# Patient Record
Sex: MALE | Race: WHITE | Employment: FULL TIME | URBAN - METROPOLITAN AREA
[De-identification: names, ages, dates, MRNs, and addresses within clinical notes are randomized per-mention and may not be internally consistent; named-entity substitution may affect disease eponyms.]

---

## 2022-12-20 ENCOUNTER — HOSPITAL ENCOUNTER (EMERGENCY)
Age: 52
Discharge: HOME | End: 2022-12-20
Attending: EMERGENCY MEDICINE | Admitting: EMERGENCY MEDICINE
Payer: MEDICAID

## 2022-12-20 ENCOUNTER — HOSPITAL ENCOUNTER (EMERGENCY)
Dept: GENERAL RADIOLOGY | Age: 52
Discharge: HOME | End: 2022-12-23
Payer: MEDICAID

## 2022-12-20 ENCOUNTER — HOSPITAL ENCOUNTER (INPATIENT)
Age: 52
LOS: 1 days | Discharge: HOME | DRG: 174 | End: 2022-12-22
Attending: INTERNAL MEDICINE | Admitting: INTERNAL MEDICINE
Payer: MEDICAID

## 2022-12-20 ENCOUNTER — HOSPITAL ENCOUNTER (EMERGENCY)
Dept: CT IMAGING | Age: 52
Discharge: HOME | End: 2022-12-23
Payer: MEDICAID

## 2022-12-20 VITALS
HEIGHT: 72 IN | WEIGHT: 250 LBS | BODY MASS INDEX: 33.86 KG/M2 | OXYGEN SATURATION: 93 % | SYSTOLIC BLOOD PRESSURE: 126 MMHG | TEMPERATURE: 98.2 F | DIASTOLIC BLOOD PRESSURE: 65 MMHG | HEART RATE: 100 BPM | RESPIRATION RATE: 18 BRPM

## 2022-12-20 DIAGNOSIS — I25.10 CAD (CORONARY ARTERY DISEASE): ICD-10-CM

## 2022-12-20 DIAGNOSIS — R07.9 CHEST PAIN, UNSPECIFIED TYPE: ICD-10-CM

## 2022-12-20 DIAGNOSIS — I21.4 NSTEMI (NON-ST ELEVATED MYOCARDIAL INFARCTION) (HCC): Primary | ICD-10-CM

## 2022-12-20 DIAGNOSIS — R07.9 CHEST PAIN: ICD-10-CM

## 2022-12-20 DIAGNOSIS — Z09 HOSPITAL DISCHARGE FOLLOW-UP: Primary | ICD-10-CM

## 2022-12-20 DIAGNOSIS — I21.4 NSTEMI (NON-ST ELEVATED MYOCARDIAL INFARCTION) (HCC): ICD-10-CM

## 2022-12-20 PROBLEM — R73.09 ELEVATED GLUCOSE: Chronic | Status: ACTIVE | Noted: 2022-12-20

## 2022-12-20 PROBLEM — I10 HYPERTENSION: Chronic | Status: ACTIVE | Noted: 2022-12-20

## 2022-12-20 PROBLEM — Z72.0 TOBACCO ABUSE: Chronic | Status: ACTIVE | Noted: 2022-12-20

## 2022-12-20 PROBLEM — I95.9 HYPOTENSION: Status: ACTIVE | Noted: 2022-12-20

## 2022-12-20 LAB
ALBUMIN SERPL-MCNC: 3.9 G/DL (ref 3.5–5)
ALBUMIN/GLOB SERPL: 1.2 {RATIO} (ref 0.4–1.6)
ALP SERPL-CCNC: 51 U/L (ref 50–136)
ALT SERPL-CCNC: 42 U/L (ref 12–65)
ANION GAP SERPL CALC-SCNC: 7 MMOL/L (ref 2–11)
APTT PPP: 40.3 SEC (ref 24.5–34.2)
AST SERPL-CCNC: 19 U/L (ref 15–37)
BILIRUB SERPL-MCNC: 0.4 MG/DL (ref 0.2–1.1)
BUN SERPL-MCNC: 19 MG/DL (ref 6–23)
CALCIUM SERPL-MCNC: 9.9 MG/DL (ref 8.3–10.4)
CHLORIDE SERPL-SCNC: 105 MMOL/L (ref 101–110)
CO2 SERPL-SCNC: 28 MMOL/L (ref 21–32)
CREAT SERPL-MCNC: 1.14 MG/DL (ref 0.8–1.5)
EKG ATRIAL RATE: 118 BPM
EKG ATRIAL RATE: 99 BPM
EKG DIAGNOSIS: NORMAL
EKG DIAGNOSIS: NORMAL
EKG P AXIS: 56 DEGREES
EKG P AXIS: 58 DEGREES
EKG P-R INTERVAL: 144 MS
EKG P-R INTERVAL: 150 MS
EKG Q-T INTERVAL: 310 MS
EKG Q-T INTERVAL: 316 MS
EKG QRS DURATION: 72 MS
EKG QRS DURATION: 74 MS
EKG QTC CALCULATION (BAZETT): 405 MS
EKG QTC CALCULATION (BAZETT): 434 MS
EKG R AXIS: -51 DEGREES
EKG R AXIS: -64 DEGREES
EKG T AXIS: 54 DEGREES
EKG T AXIS: 60 DEGREES
EKG VENTRICULAR RATE: 118 BPM
EKG VENTRICULAR RATE: 99 BPM
ERYTHROCYTE [DISTWIDTH] IN BLOOD BY AUTOMATED COUNT: 13.2 % (ref 11.9–14.6)
GLOBULIN SER CALC-MCNC: 3.2 G/DL (ref 2.8–4.5)
GLUCOSE SERPL-MCNC: 252 MG/DL (ref 65–100)
HCT VFR BLD AUTO: 48.6 % (ref 41.1–50.3)
HGB BLD-MCNC: 16.3 G/DL (ref 13.6–17.2)
INR PPP: 1.1
LIPASE SERPL-CCNC: 97 U/L (ref 73–393)
MAGNESIUM SERPL-MCNC: 2.3 MG/DL (ref 1.8–2.4)
MCH RBC QN AUTO: 29.3 PG (ref 26.1–32.9)
MCHC RBC AUTO-ENTMCNC: 33.5 G/DL (ref 31.4–35)
MCV RBC AUTO: 87.3 FL (ref 82–102)
NRBC # BLD: 0 K/UL (ref 0–0.2)
PLATELET # BLD AUTO: 306 K/UL (ref 150–450)
PMV BLD AUTO: 9.5 FL (ref 9.4–12.3)
POTASSIUM SERPL-SCNC: 4.3 MMOL/L (ref 3.5–5.1)
PROT SERPL-MCNC: 7.1 G/DL (ref 6.3–8.2)
PROTHROMBIN TIME: 14.4 SEC (ref 12.6–14.3)
RBC # BLD AUTO: 5.57 M/UL (ref 4.23–5.6)
SODIUM SERPL-SCNC: 140 MMOL/L (ref 133–143)
TROPONIN I SERPL HS-MCNC: 233.7 PG/ML (ref 0–14)
TROPONIN I SERPL HS-MCNC: 252.6 PG/ML (ref 0–14)
WBC # BLD AUTO: 8.1 K/UL (ref 4.3–11.1)

## 2022-12-20 PROCEDURE — 80053 COMPREHEN METABOLIC PANEL: CPT

## 2022-12-20 PROCEDURE — 2580000003 HC RX 258: Performed by: STUDENT IN AN ORGANIZED HEALTH CARE EDUCATION/TRAINING PROGRAM

## 2022-12-20 PROCEDURE — 6360000004 HC RX CONTRAST MEDICATION: Performed by: STUDENT IN AN ORGANIZED HEALTH CARE EDUCATION/TRAINING PROGRAM

## 2022-12-20 PROCEDURE — 83735 ASSAY OF MAGNESIUM: CPT

## 2022-12-20 PROCEDURE — 99285 EMERGENCY DEPT VISIT HI MDM: CPT

## 2022-12-20 PROCEDURE — 6370000000 HC RX 637 (ALT 250 FOR IP): Performed by: STUDENT IN AN ORGANIZED HEALTH CARE EDUCATION/TRAINING PROGRAM

## 2022-12-20 PROCEDURE — 85027 COMPLETE CBC AUTOMATED: CPT

## 2022-12-20 PROCEDURE — 85520 HEPARIN ASSAY: CPT

## 2022-12-20 PROCEDURE — G0378 HOSPITAL OBSERVATION PER HR: HCPCS

## 2022-12-20 PROCEDURE — 96375 TX/PRO/DX INJ NEW DRUG ADDON: CPT

## 2022-12-20 PROCEDURE — 71260 CT THORAX DX C+: CPT | Performed by: STUDENT IN AN ORGANIZED HEALTH CARE EDUCATION/TRAINING PROGRAM

## 2022-12-20 PROCEDURE — 93005 ELECTROCARDIOGRAM TRACING: CPT | Performed by: STUDENT IN AN ORGANIZED HEALTH CARE EDUCATION/TRAINING PROGRAM

## 2022-12-20 PROCEDURE — 71046 X-RAY EXAM CHEST 2 VIEWS: CPT

## 2022-12-20 PROCEDURE — 85610 PROTHROMBIN TIME: CPT

## 2022-12-20 PROCEDURE — 85730 THROMBOPLASTIN TIME PARTIAL: CPT

## 2022-12-20 PROCEDURE — 96365 THER/PROPH/DIAG IV INF INIT: CPT

## 2022-12-20 PROCEDURE — 96366 THER/PROPH/DIAG IV INF ADDON: CPT

## 2022-12-20 PROCEDURE — 36415 COLL VENOUS BLD VENIPUNCTURE: CPT

## 2022-12-20 PROCEDURE — 84484 ASSAY OF TROPONIN QUANT: CPT

## 2022-12-20 PROCEDURE — 6360000002 HC RX W HCPCS: Performed by: STUDENT IN AN ORGANIZED HEALTH CARE EDUCATION/TRAINING PROGRAM

## 2022-12-20 PROCEDURE — 2580000003 HC RX 258: Performed by: NURSE PRACTITIONER

## 2022-12-20 PROCEDURE — 83690 ASSAY OF LIPASE: CPT

## 2022-12-20 PROCEDURE — 6370000000 HC RX 637 (ALT 250 FOR IP): Performed by: NURSE PRACTITIONER

## 2022-12-20 RX ORDER — HEPARIN SODIUM 1000 [USP'U]/ML
2000 INJECTION, SOLUTION INTRAVENOUS; SUBCUTANEOUS PRN
Status: DISCONTINUED | OUTPATIENT
Start: 2022-12-20 | End: 2022-12-22 | Stop reason: HOSPADM

## 2022-12-20 RX ORDER — SODIUM CHLORIDE 9 MG/ML
INJECTION, SOLUTION INTRAVENOUS CONTINUOUS
Status: DISCONTINUED | OUTPATIENT
Start: 2022-12-21 | End: 2022-12-22 | Stop reason: HOSPADM

## 2022-12-20 RX ORDER — HYDROCODONE BITARTRATE AND ACETAMINOPHEN 5; 325 MG/1; MG/1
1 TABLET ORAL EVERY 6 HOURS PRN
Status: DISCONTINUED | OUTPATIENT
Start: 2022-12-20 | End: 2022-12-22 | Stop reason: HOSPADM

## 2022-12-20 RX ORDER — HEPARIN SODIUM 1000 [USP'U]/ML
60 INJECTION, SOLUTION INTRAVENOUS; SUBCUTANEOUS PRN
Status: DISCONTINUED | OUTPATIENT
Start: 2022-12-20 | End: 2022-12-20

## 2022-12-20 RX ORDER — HEPARIN SODIUM 1000 [USP'U]/ML
2000 INJECTION, SOLUTION INTRAVENOUS; SUBCUTANEOUS PRN
Status: DISCONTINUED | OUTPATIENT
Start: 2022-12-20 | End: 2022-12-20 | Stop reason: HOSPADM

## 2022-12-20 RX ORDER — SODIUM CHLORIDE 0.9 % (FLUSH) 0.9 %
5-40 SYRINGE (ML) INJECTION PRN
Status: DISCONTINUED | OUTPATIENT
Start: 2022-12-20 | End: 2022-12-22 | Stop reason: HOSPADM

## 2022-12-20 RX ORDER — ONDANSETRON 2 MG/ML
4 INJECTION INTRAMUSCULAR; INTRAVENOUS EVERY 4 HOURS PRN
Status: DISCONTINUED | OUTPATIENT
Start: 2022-12-20 | End: 2022-12-22 | Stop reason: HOSPADM

## 2022-12-20 RX ORDER — MORPHINE SULFATE 2 MG/ML
2 INJECTION, SOLUTION INTRAMUSCULAR; INTRAVENOUS EVERY 4 HOURS PRN
Status: DISCONTINUED | OUTPATIENT
Start: 2022-12-20 | End: 2022-12-22 | Stop reason: HOSPADM

## 2022-12-20 RX ORDER — HEPARIN SODIUM 10000 [USP'U]/100ML
5-30 INJECTION, SOLUTION INTRAVENOUS CONTINUOUS
Status: DISCONTINUED | OUTPATIENT
Start: 2022-12-20 | End: 2022-12-20 | Stop reason: HOSPADM

## 2022-12-20 RX ORDER — MORPHINE SULFATE 2 MG/ML
2 INJECTION, SOLUTION INTRAMUSCULAR; INTRAVENOUS
Status: COMPLETED | OUTPATIENT
Start: 2022-12-20 | End: 2022-12-20

## 2022-12-20 RX ORDER — 0.9 % SODIUM CHLORIDE 0.9 %
100 INTRAVENOUS SOLUTION INTRAVENOUS
Status: COMPLETED | OUTPATIENT
Start: 2022-12-20 | End: 2022-12-20

## 2022-12-20 RX ORDER — NITROGLYCERIN 0.4 MG/1
0.4 TABLET SUBLINGUAL EVERY 5 MIN PRN
Status: DISCONTINUED | OUTPATIENT
Start: 2022-12-20 | End: 2022-12-22 | Stop reason: HOSPADM

## 2022-12-20 RX ORDER — HEPARIN SODIUM 1000 [USP'U]/ML
4000 INJECTION, SOLUTION INTRAVENOUS; SUBCUTANEOUS ONCE
Status: COMPLETED | OUTPATIENT
Start: 2022-12-20 | End: 2022-12-20

## 2022-12-20 RX ORDER — ACETAMINOPHEN, ASPIRIN AND CAFFEINE 250; 250; 65 MG/1; MG/1; MG/1
1 TABLET, FILM COATED ORAL EVERY 6 HOURS PRN
COMMUNITY

## 2022-12-20 RX ORDER — HEPARIN SODIUM 10000 [USP'U]/100ML
5-30 INJECTION, SOLUTION INTRAVENOUS CONTINUOUS
Status: DISCONTINUED | OUTPATIENT
Start: 2022-12-20 | End: 2022-12-22 | Stop reason: HOSPADM

## 2022-12-20 RX ORDER — AMOXICILLIN AND CLAVULANATE POTASSIUM 875; 125 MG/1; MG/1
1 TABLET, FILM COATED ORAL EVERY 12 HOURS SCHEDULED
Status: DISCONTINUED | OUTPATIENT
Start: 2022-12-20 | End: 2022-12-22 | Stop reason: HOSPADM

## 2022-12-20 RX ORDER — GUAIFEN/PHENYLEPH/ACETAMINOPHN 200-5-325
2 TABLET ORAL EVERY 4 HOURS PRN
COMMUNITY

## 2022-12-20 RX ORDER — POTASSIUM CHLORIDE 7.45 MG/ML
10 INJECTION INTRAVENOUS PRN
Status: DISCONTINUED | OUTPATIENT
Start: 2022-12-20 | End: 2022-12-22 | Stop reason: HOSPADM

## 2022-12-20 RX ORDER — AMOXICILLIN 500 MG/1
500 CAPSULE ORAL 3 TIMES DAILY
COMMUNITY

## 2022-12-20 RX ORDER — ACETAMINOPHEN 325 MG/1
650 TABLET ORAL EVERY 6 HOURS PRN
Status: DISCONTINUED | OUTPATIENT
Start: 2022-12-20 | End: 2022-12-22 | Stop reason: HOSPADM

## 2022-12-20 RX ORDER — AMOXICILLIN 500 MG/1
500 CAPSULE ORAL 3 TIMES DAILY
Status: DISCONTINUED | OUTPATIENT
Start: 2022-12-20 | End: 2022-12-20

## 2022-12-20 RX ORDER — MAGNESIUM SULFATE IN WATER 40 MG/ML
2000 INJECTION, SOLUTION INTRAVENOUS PRN
Status: DISCONTINUED | OUTPATIENT
Start: 2022-12-20 | End: 2022-12-22 | Stop reason: HOSPADM

## 2022-12-20 RX ORDER — POLYETHYLENE GLYCOL 3350 17 G/17G
17 POWDER, FOR SOLUTION ORAL DAILY PRN
Status: DISCONTINUED | OUTPATIENT
Start: 2022-12-20 | End: 2022-12-22 | Stop reason: HOSPADM

## 2022-12-20 RX ORDER — SODIUM CHLORIDE 0.9 % (FLUSH) 0.9 %
10 SYRINGE (ML) INJECTION
Status: COMPLETED | OUTPATIENT
Start: 2022-12-20 | End: 2022-12-20

## 2022-12-20 RX ORDER — SODIUM CHLORIDE 0.9 % (FLUSH) 0.9 %
5-40 SYRINGE (ML) INJECTION EVERY 12 HOURS SCHEDULED
Status: DISCONTINUED | OUTPATIENT
Start: 2022-12-20 | End: 2022-12-22 | Stop reason: HOSPADM

## 2022-12-20 RX ORDER — ASPIRIN 325 MG
325 TABLET ORAL
Status: COMPLETED | OUTPATIENT
Start: 2022-12-20 | End: 2022-12-20

## 2022-12-20 RX ORDER — ACETAMINOPHEN 650 MG/1
650 SUPPOSITORY RECTAL EVERY 6 HOURS PRN
Status: DISCONTINUED | OUTPATIENT
Start: 2022-12-20 | End: 2022-12-22 | Stop reason: HOSPADM

## 2022-12-20 RX ORDER — ASPIRIN 81 MG/1
81 TABLET, CHEWABLE ORAL DAILY
Status: DISCONTINUED | OUTPATIENT
Start: 2022-12-21 | End: 2022-12-22 | Stop reason: HOSPADM

## 2022-12-20 RX ORDER — HEPARIN SODIUM 1000 [USP'U]/ML
4000 INJECTION, SOLUTION INTRAVENOUS; SUBCUTANEOUS PRN
Status: DISCONTINUED | OUTPATIENT
Start: 2022-12-20 | End: 2022-12-22 | Stop reason: HOSPADM

## 2022-12-20 RX ORDER — POTASSIUM CHLORIDE 20 MEQ/1
40 TABLET, EXTENDED RELEASE ORAL PRN
Status: DISCONTINUED | OUTPATIENT
Start: 2022-12-20 | End: 2022-12-22 | Stop reason: HOSPADM

## 2022-12-20 RX ADMIN — HEPARIN SODIUM 4000 UNITS: 1000 INJECTION INTRAVENOUS; SUBCUTANEOUS at 16:41

## 2022-12-20 RX ADMIN — SODIUM CHLORIDE 100 ML: 9 INJECTION, SOLUTION INTRAVENOUS at 16:50

## 2022-12-20 RX ADMIN — ASPIRIN 325 MG: 325 TABLET, FILM COATED ORAL at 15:46

## 2022-12-20 RX ADMIN — METOPROLOL TARTRATE 25 MG: 25 TABLET, FILM COATED ORAL at 23:08

## 2022-12-20 RX ADMIN — HEPARIN SODIUM 8 UNITS/KG/HR: 10000 INJECTION, SOLUTION INTRAVENOUS at 17:38

## 2022-12-20 RX ADMIN — MORPHINE SULFATE 2 MG: 2 INJECTION, SOLUTION INTRAMUSCULAR; INTRAVENOUS at 16:35

## 2022-12-20 RX ADMIN — SODIUM CHLORIDE, PRESERVATIVE FREE 5 ML: 5 INJECTION INTRAVENOUS at 23:11

## 2022-12-20 RX ADMIN — IOHEXOL 100 ML: 350 INJECTION, SOLUTION INTRAVENOUS at 16:50

## 2022-12-20 RX ADMIN — SODIUM CHLORIDE, PRESERVATIVE FREE 10 ML: 5 INJECTION INTRAVENOUS at 16:50

## 2022-12-20 RX ADMIN — AMOXICILLIN AND CLAVULANATE POTASSIUM 1 TABLET: 875; 125 TABLET, FILM COATED ORAL at 23:08

## 2022-12-20 ASSESSMENT — ENCOUNTER SYMPTOMS
SHORTNESS OF BREATH: 0
GASTROINTESTINAL NEGATIVE: 1
BLOOD IN STOOL: 0
SORE THROAT: 0
EYES NEGATIVE: 1
VOICE CHANGE: 0
DIARRHEA: 0
EYE DISCHARGE: 0
VOMITING: 0
APNEA: 0
COLOR CHANGE: 0
EYE PAIN: 0
NAUSEA: 0
RHINORRHEA: 0
SINUS PRESSURE: 0
ABDOMINAL PAIN: 0
COUGH: 0
SHORTNESS OF BREATH: 1
EYE REDNESS: 0
PHOTOPHOBIA: 0
WHEEZING: 0
CONSTIPATION: 0
CHEST TIGHTNESS: 0
ALLERGIC/IMMUNOLOGIC NEGATIVE: 1

## 2022-12-20 ASSESSMENT — PAIN DESCRIPTION - LOCATION
LOCATION: CHEST
LOCATION: CHEST

## 2022-12-20 ASSESSMENT — PAIN SCALES - GENERAL
PAINLEVEL_OUTOF10: 2
PAINLEVEL_OUTOF10: 2
PAINLEVEL_OUTOF10: 0
PAINLEVEL_OUTOF10: 8
PAINLEVEL_OUTOF10: 2

## 2022-12-20 ASSESSMENT — PAIN - FUNCTIONAL ASSESSMENT: PAIN_FUNCTIONAL_ASSESSMENT: 0-10

## 2022-12-20 ASSESSMENT — PAIN DESCRIPTION - DESCRIPTORS
DESCRIPTORS: HEAVINESS
DESCRIPTORS: PRESSURE

## 2022-12-20 NOTE — Clinical Note
Contrast Dose Calculator:   Patient's age: 50.   Patient's sex: Male. Patient weight (kg) = 121.3. Creatinine level (mg/dL) = 1.1. Creatinine clearance (mL/min): 138. Contrast concentration (mg/mL) = 370. MACD = 300 mL. Max Contrast dose per Creatinine Cl calculator = 310.5 mL.

## 2022-12-20 NOTE — ED PROVIDER NOTES
Emergency Department Provider Note                   PCP:                None None               Age: 50 y.o. Sex: male       ICD-10-CM    1. NSTEMI (non-ST elevated [facility]) [facility]  I21.4           DISPOSITION Decision To Transfer 12/20/2022 04:07:20 PM      ===================================  ED EKG Interpretation  EKG was interpreted in the absence of a cardiologist.    Rate: 118  EKG Interpretation: EKG Interpretation: sinus rhythm  ST Segments: Normal ST segments - NO STEMI    REBECCA CHAUDHARI 4:32 PM     ===================================  ED EKG #2 Interpretation  EKG was interpreted in the absence of a cardiologist.    Rate: 99  EKG Interpretation: EKG Interpretation: sinus rhythm  ST Segments: Normal ST segments - NO STEMI  No obvious changes when compared with first EKG except for decreased rate    REBECCA CHAUDHARI 4:32 PM       MDM  Number of Diagnoses or Management Options  NSTEMI (non-ST elevated myocardial infarction) [facility])  Diagnosis management comments: In summary this is a 79-year-old male patient presenting today with recurrent episodes of chest pain and found to have NSTEMI. He has been having episodes of recurrent chest pressure that occurs with exertion for the past several months that has been worsening over the past month. Upon my initial interview and exam, I was concerned that he might have some cardiac pathology occurring. I repeated the initial EKG which still did not show any obvious signs of ST elevations. His work-up today was remarkable for an elevated initial troponin of 252.6. I consulted our on-call cardiologist Dr. Yessica Maza. He advised [place] our [place] location for admission and possible catheterization. Heparin drip was started while here. Aspirin and morphine on board. Discussed with patient and he was in agreement. Patient stable and discharged and transferred.        Amount and/or Complexity of Data Reviewed  Clinical lab tests: ordered and reviewed  Tests in the radiology section of CPT®: ordered and reviewed  Discuss the patient with other providers: yes (Patient history, ROS, physical exam, labs, radiological workup and all pertinent findings were discussed with attending Dr. Kayla Weiner; Dr. Carmen Heart cardiology)  Independent visualization of images, tracings, or specimens: yes    Risk of Complications, Morbidity, and/or Mortality  Presenting problems: moderate  Diagnostic procedures: moderate  Management options: moderate    Patient Progress  Patient progress: stable                                  Orders Placed This Encounter   Procedures    XR CHEST (2 VW)    CT CHEST PULMONARY EMBOLISM W CONTRAST    CBC    Comprehensive Metabolic Panel    Troponin    Lipase    Magnesium    APTT    Protime-INR    Anti-Xa, Unfractionated Heparin    Cardiac Monitor    Pulse Oximetry    EKG 12 Lead    EKG 12 Lead    Saline lock IV        Medications   morphine (PF) injection 2 mg (has no administration in time range)   heparin (porcine) injection 4,000 Units (has no administration in time range)   heparin (porcine) injection 2,000 Units (has no administration in time range)   heparin 25,000 units in dextrose 5% 250 mL (premix) infusion (has no administration in time range)   aspirin tablet 325 mg (325 mg Oral Given 12/20/22 0703)       New Prescriptions    No medications on file        Jose Antonio Ruby is a 50 y.o. male who presents to the Emergency Department with chief complaint of    Chief Complaint   Patient presents with    Chest Pain      70-year-old male patient with history of pack-a-day tobacco use, diabetes, on cialis for ED presents today complaining of intermittent chest pains that been ongoing for several months. He reports it typically comes on with exertion and will last until he is at rest.  He reports he was seen in the ED 6 months ago where he was recommended to stay for stress testing but refused and left AMA.   He reports he never followed up with a cardiologist.  He reports he is currently having chest pain and that this is now his second episode he has had today. Reports this episode has lasted for the past 30 minutes and it is described as pressure and constant over the center of his chest.  He reports it started while he was walking and has not stopped. He states it does not radiate anywhere. He denies it being described as ripping or tearing. He denies motor deficits or sensory deficits or abdominal pain. He denies pleuritic pain, hemoptysis, dyspnea, recent travel, recent surgery, malignancy, prior DVT or PE. He denies diaphoresis, emesis. Patient is primary historian and quality is reliable. The history is provided by the patient. No  was used. Review of Systems   Constitutional:  Negative for appetite change, chills, fatigue, fever and unexpected weight change. HENT:  Negative for congestion, ear pain, hearing loss, postnasal drip, rhinorrhea, sinus pressure, sore throat and voice change. Eyes:  Negative for photophobia, pain, discharge, redness and visual disturbance. Respiratory:  Negative for apnea, cough, chest tightness, shortness of breath and wheezing. Cardiovascular:  Positive for chest pain. Negative for palpitations and leg swelling. Gastrointestinal:  Negative for abdominal pain, blood in stool, constipation, diarrhea, nausea and vomiting. Endocrine: Negative for polydipsia, polyphagia and polyuria. Genitourinary:  Negative for decreased urine volume, dysuria, flank pain, frequency and hematuria. Musculoskeletal:  Negative for arthralgias, joint swelling, myalgias and neck stiffness. Skin:  Negative for color change, rash and wound. Neurological:  Negative for dizziness, syncope, speech difficulty, weakness, light-headedness and headaches. Hematological:  Negative for adenopathy. Does not bruise/bleed easily.    Psychiatric/Behavioral:  Negative for confusion, self-injury, sleep disturbance and suicidal ideas. All other systems reviewed and are negative. Past Medical History:   Diagnosis Date    Migraines     Prediabetes         History reviewed. No pertinent surgical history. History reviewed. No pertinent family history. Social History     Socioeconomic History    Marital status: Legally      Spouse name: None    Number of children: None    Years of education: None    Highest education level: None         Patient has no known allergies. Previous Medications    AMOXICILLIN (AMOXIL) 500 MG CAPSULE    Take 500 mg by mouth 3 times daily    ASPIRIN-ACETAMINOPHEN-CAFFEINE (EXCEDRIN MIGRAINE) 250-250-65 MG PER TABLET    Take 1 tablet by mouth every 6 hours as needed for Headaches        Vitals signs and nursing note reviewed. Patient Vitals for the past 4 hrs:   Temp Pulse Resp BP SpO2   12/20/22 1611 -- 96 -- (!) 151/80 95 %   12/20/22 1541 -- 100 -- (!) 142/86 95 %   12/20/22 1517 -- 94 17 135/84 97 %   12/20/22 1458 -- 97 -- -- --   12/20/22 1447 -- 99 16 133/79 97 %   12/20/22 1242 98.2 °F (36.8 °C) (!) 118 16 137/82 98 %          Physical Exam  Vitals and nursing note reviewed. Constitutional:       General: He is not in acute distress. Appearance: Normal appearance. He is obese. He is not ill-appearing, toxic-appearing or diaphoretic. Comments: Overall well-appearing. Positive Cervantes sign when describing pain that he is feeling. Resting comfortably in chair. Even nonlabored respirations. Alert and oriented x4. Pleasant and cooperative. HENT:      Head: Normocephalic and atraumatic. Right Ear: External ear normal.      Left Ear: External ear normal.      Nose: Nose normal.      Mouth/Throat:      Mouth: Mucous membranes are moist.      Pharynx: Oropharynx is clear. Eyes:      Extraocular Movements: Extraocular movements intact. Pupils: Pupils are equal, round, and reactive to light. Neck:      Vascular: No carotid bruit. Comments: No carotid bruits or pulse deficits  Cardiovascular:      Rate and Rhythm: Regular rhythm. Tachycardia present. Pulses: Normal pulses. Heart sounds: Normal heart sounds, S1 normal and S2 normal. No murmur heard. Comments: Tachycardic at a rate of about 110 upon exam.  No JVD. Heart sounds not muffled. No Kussmaul sign. Pulses equal in bilateral upper and lower extremities. No deficits. Blood pressures within 10mmHg on upper extremities. Pulmonary:      Effort: Pulmonary effort is normal. No respiratory distress. Breath sounds: Normal breath sounds. No stridor. No wheezing, rhonchi or rales. Comments: Breath sounds equal and without adventitious sounds. No chest wall tenderness. Chest:      Chest wall: No tenderness. Abdominal:      General: Abdomen is flat. Bowel sounds are normal.      Palpations: Abdomen is soft. Tenderness: There is no abdominal tenderness. There is no right CVA tenderness, left CVA tenderness, guarding or rebound. Comments: No pulsatile mass. Musculoskeletal:         General: No tenderness. Normal range of motion. Cervical back: Normal range of motion and neck supple. No tenderness. Right lower leg: No edema. Left lower leg: No edema. Comments: No signs of DVT bilaterally   Lymphadenopathy:      Cervical: No cervical adenopathy. Skin:     General: Skin is warm and dry. Capillary Refill: Capillary refill takes less than 2 seconds. Coloration: Skin is not jaundiced. Neurological:      General: No focal deficit present. Mental Status: He is alert and oriented to person, place, and time. Mental status is at baseline. Sensory: No sensory deficit. Motor: No weakness.       Gait: Gait normal.   Psychiatric:         Mood and Affect: Mood normal.         Behavior: Behavior normal.         Judgment: Judgment normal.        Procedures    Results for orders placed or performed during the hospital encounter of 12/20/22   XR CHEST (2 VW)    Narrative    PA AND LATERAL CHEST X-RAY. Clinical Indication: Chest pain    Comparison: No prior    Findings: 2 views of the chest submitted demonstrate the cardiac silhouette and  mediastinum to be unremarkable. There is no pleural effusion or pneumothorax. The lung parenchyma is clear. The included osseous structures are unremarkable. Impression    Normal chest x-ray.        CBC   Result Value Ref Range    WBC 8.1 4.3 - 11.1 K/uL    RBC 5.57 4.23 - 5.6 M/uL    Hemoglobin 16.3 13.6 - 17.2 g/dL    Hematocrit 48.6 41.1 - 50.3 %    MCV 87.3 82.0 - 102.0 FL    MCH 29.3 26.1 - 32.9 PG    MCHC 33.5 31.4 - 35.0 g/dL    RDW 13.2 11.9 - 14.6 %    Platelets 242 371 - 699 K/uL    MPV 9.5 9.4 - 12.3 FL    nRBC 0.00 0.0 - 0.2 K/uL   Comprehensive Metabolic Panel   Result Value Ref Range    Sodium 140 133 - 143 mmol/L    Potassium 4.3 3.5 - 5.1 mmol/L    Chloride 105 101 - 110 mmol/L    CO2 28 21 - 32 mmol/L    Anion Gap 7 2 - 11 mmol/L    Glucose 252 (H) 65 - 100 mg/dL    BUN 19 6 - 23 MG/DL    Creatinine 1.14 0.8 - 1.5 MG/DL    Est, Glom Filt Rate >60 >60 ml/min/1.73m2    Calcium 9.9 8.3 - 10.4 MG/DL    Total Bilirubin 0.4 0.2 - 1.1 MG/DL    ALT 42 12 - 65 U/L    AST 19 15 - 37 U/L    Alk Phosphatase 51 50 - 136 U/L    Total Protein 7.1 6.3 - 8.2 g/dL    Albumin 3.9 3.5 - 5.0 g/dL    Globulin 3.2 2.8 - 4.5 g/dL    Albumin/Globulin Ratio 1.2 0.4 - 1.6     Troponin   Result Value Ref Range    Troponin, High Sensitivity 252.6 (HH) 0 - 14 pg/mL   Lipase   Result Value Ref Range    Lipase 97 73 - 393 U/L   Magnesium   Result Value Ref Range    Magnesium 2.3 1.8 - 2.4 mg/dL   EKG 12 Lead   Result Value Ref Range    Ventricular Rate 118 BPM    Atrial Rate 118 BPM    P-R Interval 144 ms    QRS Duration 74 ms    Q-T Interval 310 ms    QTc Calculation (Bazett) 434 ms    P Axis 56 degrees    R Axis -64 degrees    T Axis 54 degrees    Diagnosis Sinus tachycardia    EKG 12 Lead   Result Value Ref Range    Ventricular Rate 99 BPM    Atrial Rate 99 BPM    P-R Interval 150 ms    QRS Duration 72 ms    Q-T Interval 316 ms    QTc Calculation (Bazett) 405 ms    P Axis 58 degrees    R Axis -51 degrees    T Axis 60 degrees    Diagnosis !! AGE AND GENDER SPECIFIC ECG ANALYSIS !!         XR CHEST (2 VW)   Final Result   Normal chest x-ray. CT CHEST PULMONARY EMBOLISM W CONTRAST    (Results Pending)                       Voice dictation software was used during the making of this note. This software is not perfect and grammatical and other typographical errors may be present. This note has not been completely proofread for errors.      Jackye Shone, [place]  12/20/22 1397

## 2022-12-20 NOTE — Clinical Note
Vessel: circumflex. Guidewire exchanged with GUIDEWIRE VASC [id number] [id number] TIP L[facility]CM [facility]  First choice pt was damaged

## 2022-12-20 NOTE — ED TRIAGE NOTES
Pt ambulatory to triage with c/o intermittent center chest pain he describes as \"someone dropping a ton of bricks\" on his chest for mayo 1 month. Pt report diaphoresis with the pain. Pt denies any heart or lung problems. Pt reports taking Excedrin. EKG performed prior to triage.

## 2022-12-20 NOTE — Clinical Note
TRANSFER - OUT REPORT:     Verbal report given to: CPRU. Report consisted of patient's Situation, Background, Assessment and   Recommendations(SBAR). Opportunity for questions and clarification was provided. Patient transported with a Registered Nurse and [address] [place] [address] / [facility] [employer].

## 2022-12-20 NOTE — Clinical Note
TRANSFER - IN REPORT:     Verbal report received from: TELE RN. Report consisted of patient's Situation, Background, Assessment and   Recommendations(SBAR). Opportunity for questions and clarification was provided. Assessment completed upon patient's arrival to unit and care assumed. Patient transported with a Registered Nurse.

## 2022-12-21 ENCOUNTER — APPOINTMENT (OUTPATIENT)
Dept: NON INVASIVE DIAGNOSTICS | Age: 52
DRG: 174 | End: 2022-12-21
Attending: INTERNAL MEDICINE
Payer: MEDICAID

## 2022-12-21 ENCOUNTER — ANESTHESIA EVENT (OUTPATIENT)
Dept: CARDIAC CATH/INVASIVE PROCEDURES | Age: 52
DRG: 174 | End: 2022-12-21
Payer: MEDICAID

## 2022-12-21 LAB
25(OH)D3 SERPL-MCNC: 35.9 NG/ML (ref 30–100)
ANION GAP SERPL CALC-SCNC: 5 MMOL/L (ref 2–11)
BUN SERPL-MCNC: 18 MG/DL (ref 6–23)
CALCIUM SERPL-MCNC: 8.9 MG/DL (ref 8.3–10.4)
CHLORIDE SERPL-SCNC: 107 MMOL/L (ref 101–110)
CHOLEST SERPL-MCNC: 183 MG/DL
CO2 SERPL-SCNC: 27 MMOL/L (ref 21–32)
CREAT SERPL-MCNC: 1 MG/DL (ref 0.8–1.5)
ECHO AO ASC DIAM: 3.4 CM
ECHO AO ASCENDING AORTA INDEX: 1.41 CM/M2
ECHO AO ROOT DIAM: 4.1 CM
ECHO AO ROOT INDEX: 1.7 CM/M2
ECHO AV AREA PEAK VELOCITY: 3.2 CM2
ECHO AV AREA VTI: 3.3 CM2
ECHO AV AREA/BSA PEAK VELOCITY: 1.3 CM2/M2
ECHO AV AREA/BSA VTI: 1.4 CM2/M2
ECHO AV MEAN GRADIENT: 4 MMHG
ECHO AV MEAN VELOCITY: 0.9 M/S
ECHO AV PEAK GRADIENT: 7 MMHG
ECHO AV PEAK VELOCITY: 1.3 M/S
ECHO AV VELOCITY RATIO: 0.77
ECHO AV VTI: 24.4 CM
ECHO BSA: 2.48 M2
ECHO BSA: 2.48 M2
ECHO IVC PROX: 1.9 CM
ECHO LA AREA 2C: 21.2 CM2
ECHO LA AREA 4C: 21.5 CM2
ECHO LA DIAMETER INDEX: 1.66 CM/M2
ECHO LA DIAMETER: 4 CM
ECHO LA MAJOR AXIS: 6.4 CM
ECHO LA MINOR AXIS: 6.6 CM
ECHO LA TO AORTIC ROOT RATIO: 0.98
ECHO LA VOL 2C: 55 ML (ref 18–58)
ECHO LA VOL 4C: 56 ML (ref 18–58)
ECHO LA VOL BP: 56 ML (ref 18–58)
ECHO LA VOL/BSA BIPLANE: 23 ML/M2 (ref 16–34)
ECHO LA VOLUME INDEX A2C: 23 ML/M2 (ref 16–34)
ECHO LA VOLUME INDEX A4C: 23 ML/M2 (ref 16–34)
ECHO LV E' LATERAL VELOCITY: 7 CM/S
ECHO LV E' SEPTAL VELOCITY: 7 CM/S
ECHO LV EDV A2C: 126 ML
ECHO LV EDV A4C: 114 ML
ECHO LV EDV INDEX A4C: 47 ML/M2
ECHO LV EDV NDEX A2C: 52 ML/M2
ECHO LV EJECTION FRACTION A2C: 63 %
ECHO LV EJECTION FRACTION A4C: 55 %
ECHO LV EJECTION FRACTION BIPLANE: 59 % (ref 55–100)
ECHO LV ESV A2C: 47 ML
ECHO LV ESV A4C: 51 ML
ECHO LV ESV INDEX A2C: 20 ML/M2
ECHO LV ESV INDEX A4C: 21 ML/M2
ECHO LV FRACTIONAL SHORTENING: 28 % (ref 28–44)
ECHO LV INTERNAL DIMENSION DIASTOLE INDEX: 1.91 CM/M2
ECHO LV INTERNAL DIMENSION DIASTOLIC: 4.6 CM (ref 4.2–5.9)
ECHO LV INTERNAL DIMENSION SYSTOLIC INDEX: 1.37 CM/M2
ECHO LV INTERNAL DIMENSION SYSTOLIC: 3.3 CM
ECHO LV IVSD: 1.2 CM (ref 0.6–1)
ECHO LV MASS 2D: 192.9 G (ref 88–224)
ECHO LV MASS INDEX 2D: 80.1 G/M2 (ref 49–115)
ECHO LV POSTERIOR WALL DIASTOLIC: 1.1 CM (ref 0.6–1)
ECHO LV RELATIVE WALL THICKNESS RATIO: 0.48
ECHO LVOT AREA: 4.2 CM2
ECHO LVOT AV VTI INDEX: 0.79
ECHO LVOT DIAM: 2.3 CM
ECHO LVOT MEAN GRADIENT: 2 MMHG
ECHO LVOT PEAK GRADIENT: 4 MMHG
ECHO LVOT PEAK VELOCITY: 1 M/S
ECHO LVOT STROKE VOLUME INDEX: 33.1 ML/M2
ECHO LVOT SV: 79.7 ML
ECHO LVOT VTI: 19.2 CM
ECHO MV A VELOCITY: 0.8 M/S
ECHO MV E DECELERATION TIME (DT): 189 MS
ECHO MV E VELOCITY: 0.85 M/S
ECHO MV E/A RATIO: 1.06
ECHO MV E/E' LATERAL: 12.14
ECHO MV E/E' RATIO (AVERAGED): 12.14
ECHO MV E/E' SEPTAL: 12.14
ECHO RV BASAL DIMENSION: 2.7 CM
ECHO RV FREE WALL PEAK S': 12 CM/S
ECHO RV TAPSE: 1.9 CM (ref 1.7–?)
ERYTHROCYTE [DISTWIDTH] IN BLOOD BY AUTOMATED COUNT: 13.2 % (ref 11.9–14.6)
EST. AVERAGE GLUCOSE BLD GHB EST-MCNC: 214 MG/DL
GLUCOSE SERPL-MCNC: 200 MG/DL (ref 65–100)
HBA1C MFR BLD: 9.1 % (ref 4.8–5.6)
HCT VFR BLD AUTO: 48.5 % (ref 41.1–50.3)
HDLC SERPL-MCNC: 24 MG/DL (ref 40–60)
HDLC SERPL: 7.6 {RATIO}
HGB BLD-MCNC: 15.9 G/DL (ref 13.6–17.2)
LDLC SERPL CALC-MCNC: 129.4 MG/DL
MCH RBC QN AUTO: 29 PG (ref 26.1–32.9)
MCHC RBC AUTO-ENTMCNC: 32.8 G/DL (ref 31.4–35)
MCV RBC AUTO: 88.3 FL (ref 82–102)
NRBC # BLD: 0 K/UL (ref 0–0.2)
PLATELET # BLD AUTO: 282 K/UL (ref 150–450)
PMV BLD AUTO: 9.4 FL (ref 9.4–12.3)
POTASSIUM SERPL-SCNC: 4.7 MMOL/L (ref 3.5–5.1)
RBC # BLD AUTO: 5.49 M/UL (ref 4.23–5.6)
SODIUM SERPL-SCNC: 139 MMOL/L (ref 133–143)
TRIGL SERPL-MCNC: 148 MG/DL (ref 35–150)
TSH W FREE THYROID IF ABNORMAL: 1.34 UIU/ML (ref 0.36–3.74)
UFH PPP CHRO-ACNC: 0.16 IU/ML (ref 0.3–0.7)
UFH PPP CHRO-ACNC: <0.1 IU/ML (ref 0.3–0.7)
VLDLC SERPL CALC-MCNC: 29.6 MG/DL (ref 6–23)
WBC # BLD AUTO: 9 K/UL (ref 4.3–11.1)

## 2022-12-21 PROCEDURE — C1769 GUIDE WIRE: HCPCS | Performed by: INTERNAL MEDICINE

## 2022-12-21 PROCEDURE — 6370000000 HC RX 637 (ALT 250 FOR IP): Performed by: INTERNAL MEDICINE

## 2022-12-21 PROCEDURE — B2111ZZ FLUOROSCOPY OF MULTIPLE CORONARY ARTERIES USING LOW OSMOLAR CONTRAST: ICD-10-PCS | Performed by: INTERNAL MEDICINE

## 2022-12-21 PROCEDURE — 84443 ASSAY THYROID STIM HORMONE: CPT

## 2022-12-21 PROCEDURE — G0378 HOSPITAL OBSERVATION PER HR: HCPCS

## 2022-12-21 PROCEDURE — 6360000002 HC RX W HCPCS: Performed by: NURSE PRACTITIONER

## 2022-12-21 PROCEDURE — C1894 INTRO/SHEATH, NON-LASER: HCPCS | Performed by: INTERNAL MEDICINE

## 2022-12-21 PROCEDURE — 93452 LEFT HRT CATH W/VENTRCLGRPHY: CPT | Performed by: INTERNAL MEDICINE

## 2022-12-21 PROCEDURE — A4216 STERILE WATER/SALINE, 10 ML: HCPCS | Performed by: INTERNAL MEDICINE

## 2022-12-21 PROCEDURE — C8929 TTE W OR WO FOL WCON,DOPPLER: HCPCS

## 2022-12-21 PROCEDURE — 80048 BASIC METABOLIC PNL TOTAL CA: CPT

## 2022-12-21 PROCEDURE — 83036 HEMOGLOBIN GLYCOSYLATED A1C: CPT

## 2022-12-21 PROCEDURE — 6360000004 HC RX CONTRAST MEDICATION: Performed by: INTERNAL MEDICINE

## 2022-12-21 PROCEDURE — 85027 COMPLETE CBC AUTOMATED: CPT

## 2022-12-21 PROCEDURE — 93458 L HRT ARTERY/VENTRICLE ANGIO: CPT | Performed by: INTERNAL MEDICINE

## 2022-12-21 PROCEDURE — 85520 HEPARIN ASSAY: CPT

## 2022-12-21 PROCEDURE — 82306 VITAMIN D 25 HYDROXY: CPT

## 2022-12-21 PROCEDURE — 4A023N7 MEASUREMENT OF CARDIAC SAMPLING AND PRESSURE, LEFT HEART, PERCUTANEOUS APPROACH: ICD-10-PCS | Performed by: INTERNAL MEDICINE

## 2022-12-21 PROCEDURE — 80061 LIPID PANEL: CPT

## 2022-12-21 PROCEDURE — 6360000002 HC RX W HCPCS: Performed by: INTERNAL MEDICINE

## 2022-12-21 PROCEDURE — 2500000003 HC RX 250 WO HCPCS: Performed by: INTERNAL MEDICINE

## 2022-12-21 PROCEDURE — C1887 CATHETER, GUIDING: HCPCS | Performed by: INTERNAL MEDICINE

## 2022-12-21 PROCEDURE — 6370000000 HC RX 637 (ALT 250 FOR IP): Performed by: NURSE PRACTITIONER

## 2022-12-21 PROCEDURE — 2709999900 HC NON-CHARGEABLE SUPPLY: Performed by: INTERNAL MEDICINE

## 2022-12-21 PROCEDURE — 99153 MOD SED SAME PHYS/QHP EA: CPT | Performed by: INTERNAL MEDICINE

## 2022-12-21 PROCEDURE — 2580000003 HC RX 258: Performed by: NURSE PRACTITIONER

## 2022-12-21 PROCEDURE — 99152 MOD SED SAME PHYS/QHP 5/>YRS: CPT | Performed by: INTERNAL MEDICINE

## 2022-12-21 PROCEDURE — 2580000003 HC RX 258: Performed by: INTERNAL MEDICINE

## 2022-12-21 RX ORDER — HEPARIN SODIUM 200 [USP'U]/100ML
INJECTION, SOLUTION INTRAVENOUS CONTINUOUS PRN
Status: COMPLETED | OUTPATIENT
Start: 2022-12-21 | End: 2022-12-21

## 2022-12-21 RX ORDER — LOSARTAN POTASSIUM 25 MG/1
25 TABLET ORAL DAILY
Status: DISCONTINUED | OUTPATIENT
Start: 2022-12-21 | End: 2022-12-22

## 2022-12-21 RX ORDER — ROSUVASTATIN CALCIUM 20 MG/1
40 TABLET, COATED ORAL NIGHTLY
Status: DISCONTINUED | OUTPATIENT
Start: 2022-12-21 | End: 2022-12-22 | Stop reason: HOSPADM

## 2022-12-21 RX ORDER — MIDAZOLAM HYDROCHLORIDE 1 MG/ML
INJECTION INTRAMUSCULAR; INTRAVENOUS PRN
Status: DISCONTINUED | OUTPATIENT
Start: 2022-12-21 | End: 2022-12-21 | Stop reason: HOSPADM

## 2022-12-21 RX ORDER — COLCHICINE 0.6 MG/1
0.6 TABLET ORAL DAILY
Status: DISCONTINUED | OUTPATIENT
Start: 2022-12-21 | End: 2022-12-22 | Stop reason: HOSPADM

## 2022-12-21 RX ORDER — METOPROLOL SUCCINATE 25 MG/1
50 TABLET, EXTENDED RELEASE ORAL DAILY
Status: DISCONTINUED | OUTPATIENT
Start: 2022-12-21 | End: 2022-12-22

## 2022-12-21 RX ORDER — LIDOCAINE HYDROCHLORIDE 10 MG/ML
INJECTION, SOLUTION INFILTRATION; PERINEURAL PRN
Status: DISCONTINUED | OUTPATIENT
Start: 2022-12-21 | End: 2022-12-21 | Stop reason: HOSPADM

## 2022-12-21 RX ADMIN — ASPIRIN 81 MG: 81 TABLET, CHEWABLE ORAL at 08:14

## 2022-12-21 RX ADMIN — HEPARIN SODIUM 14 UNITS/KG/HR: 10000 INJECTION, SOLUTION INTRAVENOUS at 16:58

## 2022-12-21 RX ADMIN — METOPROLOL SUCCINATE 50 MG: 25 TABLET, EXTENDED RELEASE ORAL at 08:14

## 2022-12-21 RX ADMIN — LOSARTAN POTASSIUM 25 MG: 25 TABLET, FILM COATED ORAL at 08:14

## 2022-12-21 RX ADMIN — TICAGRELOR 180 MG: 90 TABLET ORAL at 15:08

## 2022-12-21 RX ADMIN — AMOXICILLIN AND CLAVULANATE POTASSIUM 1 TABLET: 875; 125 TABLET, FILM COATED ORAL at 20:06

## 2022-12-21 RX ADMIN — ROSUVASTATIN CALCIUM 40 MG: 20 TABLET, COATED ORAL at 20:06

## 2022-12-21 RX ADMIN — SODIUM CHLORIDE: 9 INJECTION, SOLUTION INTRAVENOUS at 02:06

## 2022-12-21 RX ADMIN — PERFLUTREN 0.45 ML: 6.52 INJECTION, SUSPENSION INTRAVENOUS at 09:54

## 2022-12-21 RX ADMIN — HEPARIN SODIUM 4000 UNITS: 1000 INJECTION INTRAVENOUS; SUBCUTANEOUS at 02:04

## 2022-12-21 RX ADMIN — COLCHICINE 0.6 MG: 0.6 TABLET, FILM COATED ORAL at 08:14

## 2022-12-21 RX ADMIN — HEPARIN SODIUM 2000 UNITS: 1000 INJECTION INTRAVENOUS; SUBCUTANEOUS at 09:37

## 2022-12-21 RX ADMIN — SODIUM CHLORIDE, PRESERVATIVE FREE 5 ML: 5 INJECTION INTRAVENOUS at 20:06

## 2022-12-21 RX ADMIN — SODIUM CHLORIDE, PRESERVATIVE FREE 5 ML: 5 INJECTION INTRAVENOUS at 08:16

## 2022-12-21 RX ADMIN — AMOXICILLIN AND CLAVULANATE POTASSIUM 1 TABLET: 875; 125 TABLET, FILM COATED ORAL at 08:14

## 2022-12-21 ASSESSMENT — PAIN SCALES - GENERAL
PAINLEVEL_OUTOF10: 0

## 2022-12-21 NOTE — PROGRESS NOTES
Report received from [facility] RN. Procedural findings communicated. Intra procedural  medication administration reviewed. Progression of care discussed.      Patient received into [id number] [facility] 3 post sheath removal.     Access site without bleeding or swelling yes    Dressing dry and intact yes    Patient instructed to limit movement to right upper extremity    Routine post procedural vital signs and site assessment initiated yes

## 2022-12-21 NOTE — H&P
[facility] Cardiology History & Physical      Date of  Admission: 12/20/2022  9:50 PM     Primary Care Physician: None  Primary Cardiologist: None  Admitting Physician: Yadira Roberson    CC: chest pain     HPI:  Gabino Stein is a 50 y.o. male with past medical history of tobacco abuse, headaches, and prediabetes who presented to the ER at [facility] with complaints of chest pain. Patient reports that he has had intermittent chest pain for the past several months, however, it has gotten more frequent over the past month. He describes exertional chest pressure that is relieved with rest.     Upon arrival to the ER, EKG shows SR without acute ST/T wave changes. Serial cardiac enzymes were done and noted to be elevated but stable at 252.6 and 233.7. He was given 325mg ASA, IV norphine and started on IV heparin prior to transfer to [facility] facility. Upon arrival to the telemetry unit at [facility], he is currently chest pain. Admits to smoking 1-1.5 ppd. Reports that he stopped smoking for 10 years but restarted approximately a year and a half ago. Social history -- + tobacco, denies alcohol or illicit drug use  Family history -- father with aortic aneurysm but no known CAD     Past Medical History:   Diagnosis Date    Migraines     Prediabetes       No past surgical history on file.     No Known Allergies   Social History     Socioeconomic History    Marital status: Legally      Spouse name: Not on file    Number of children: Not on file    Years of education: Not on file    Highest education level: Not on file   Occupational History    Not on file   Tobacco Use    Smoking status: Not on file    Smokeless tobacco: Not on file   Substance and Sexual Activity    Alcohol use: Not on file    Drug use: Not on file    Sexual activity: Not on file   Other Topics Concern    Not on file   Social History Narrative    Not on file     Social Determinants of Health     Financial Resource Strain: Not on file   Food Insecurity: Not on file   Transportation Needs: Not on file   Physical Activity: Not on file   Stress: Not on file   Social Connections: Not on file   Intimate Partner Violence: Not on file   Housing Stability: Not on file     No family history on file. No current facility-administered medications for this encounter. Review of Systems    Review of Systems   Constitutional: Positive for diaphoresis. HENT: Negative. Eyes: Negative. Cardiovascular:  Positive for chest pain. Respiratory:  Positive for shortness of breath. Endocrine: Negative. Hematologic/Lymphatic: Negative. Skin: Negative. Musculoskeletal: Negative. Gastrointestinal: Negative. Genitourinary: Negative. Neurological: Negative. Psychiatric/Behavioral: Negative. Allergic/Immunologic: Negative. Subjective:   BP (!) 135/90   Pulse (!) 104   Temp 98.7 °F (37.1 °C)   Resp 18   Ht 6' (1.829 m)   Wt 267 lb 8 oz (121.3 kg)   SpO2 94%   BMI 36.28 kg/m²   Physical Exam  HENT:      Mouth/Throat:      Mouth: Mucous membranes are moist.   Eyes:      Pupils: Pupils are equal, round, and reactive to light. Cardiovascular:      Rate and Rhythm: Normal rate and regular rhythm. Heart sounds: Normal heart sounds. Pulmonary:      Breath sounds: Normal breath sounds. Abdominal:      General: Bowel sounds are normal.   Musculoskeletal:         General: No swelling. Skin:     General: Skin is warm and dry. Neurological:      Mental Status: He is alert and oriented to person, place, and time.    Psychiatric:         Mood and Affect: Mood normal.        Cardiographics  Telemetry:  not on telemetry at the time of interview  ECG: normal sinus rhythm  Echocardiogram:  ordered/pending    Labs:   Recent Results (from the past 24 hour(s))   EKG 12 Lead    Collection Time: 12/20/22 12:35 PM   Result Value Ref Range    Ventricular Rate 118 BPM    Atrial Rate 118 BPM    P-R Interval 144 ms    QRS Duration 74 ms    Q-T Interval 310 ms    QTc Calculation (Bazett) 434 ms    P Axis 56 degrees    R Axis -64 degrees    T Axis 54 degrees    Diagnosis Sinus tachycardia    EKG 12 Lead    Collection Time: 12/20/22  2:45 PM   Result Value Ref Range    Ventricular Rate 99 BPM    Atrial Rate 99 BPM    P-R Interval 150 ms    QRS Duration 72 ms    Q-T Interval 316 ms    QTc Calculation (Bazett) 405 ms    P Axis 58 degrees    R Axis -51 degrees    T Axis 60 degrees    Diagnosis !! AGE AND GENDER SPECIFIC ECG ANALYSIS !!    CBC    Collection Time: 12/20/22  3:02 PM   Result Value Ref Range    WBC 8.1 4.3 - 11.1 K/uL    RBC 5.57 4.23 - 5.6 M/uL    Hemoglobin 16.3 13.6 - 17.2 g/dL    Hematocrit 48.6 41.1 - 50.3 %    MCV 87.3 82.0 - 102.0 FL    MCH 29.3 26.1 - 32.9 PG    MCHC 33.5 31.4 - 35.0 g/dL    RDW 13.2 11.9 - 14.6 %    Platelets 672 699 - 632 K/uL    MPV 9.5 9.4 - 12.3 FL    nRBC 0.00 0.0 - 0.2 K/uL   Comprehensive Metabolic Panel    Collection Time: 12/20/22  3:02 PM   Result Value Ref Range    Sodium 140 133 - 143 mmol/L    Potassium 4.3 3.5 - 5.1 mmol/L    Chloride 105 101 - 110 mmol/L    CO2 28 21 - 32 mmol/L    Anion Gap 7 2 - 11 mmol/L    Glucose 252 (H) 65 - 100 mg/dL    BUN 19 6 - 23 MG/DL    Creatinine 1.14 0.8 - 1.5 MG/DL    Est, Glom Filt Rate >60 >60 ml/min/1.73m2    Calcium 9.9 8.3 - 10.4 MG/DL    Total Bilirubin 0.4 0.2 - 1.1 MG/DL    ALT 42 12 - 65 U/L    AST 19 15 - 37 U/L    Alk Phosphatase 51 50 - 136 U/L    Total Protein 7.1 6.3 - 8.2 g/dL    Albumin 3.9 3.5 - 5.0 g/dL    Globulin 3.2 2.8 - 4.5 g/dL    Albumin/Globulin Ratio 1.2 0.4 - 1.6     Troponin    Collection Time: 12/20/22  3:02 PM   Result Value Ref Range    Troponin, High Sensitivity 252.6 (HH) 0 - 14 pg/mL   Lipase    Collection Time: 12/20/22  3:02 PM   Result Value Ref Range    Lipase 97 73 - 393 U/L   Magnesium    Collection Time: 12/20/22  3:02 PM   Result Value Ref Range    Magnesium 2.3 1.8 - 2.4 mg/dL   Troponin    Collection Time: 12/20/22  5:31 PM Result Value Ref Range    Troponin, High Sensitivity 233.7 (HH) 0 - 14 pg/mL   APTT    Collection Time: 12/20/22  5:31 PM   Result Value Ref Range    PTT 40.3 (H) 24.5 - 34.2 SEC   Protime-INR    Collection Time: 12/20/22  5:31 PM   Result Value Ref Range    Protime 14.4 (H) 12.6 - 14.3 sec    INR 1.1         Patient has been seen and examined by Dr. Dionne Ramirez and he agrees with the following assessment and plan:     Assessment/Plan:        Chest pain -- admit to telemetry. Continue IV heparin. Start low dose lopressor BID, low dose ASA in the AM. Check lipid panel in the AM. NPO after midnight with IV hydration. Plan for C with possible PCI tomorrow. Check echocardiogram tomorrow      Tobacco abuse -- importance of cessation discussed and encouraged      Hypertension -- not on medications as outpatient. Start low dose lopressor BID. Monitor BP closely. 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 Does not have PCP. Check A1c in the AM.       Skin infection -- evaluated recently at urgent care center. ABX switched today after culture results were back. Continue ABX x 7 days. Plan for cath for what is probably a type I troponin leak we will review lab work optimize medical therapy proceed with cath possible PCI    Pt set up for procedure. Risks benefits and alternatives discussed. Pt agrees to proceed. Risks of bleeding infection emergent surgical procedure loss of life or limb renal failure and other known risks discussed. Pt agrees to proceed and agrees to sign consent form. Current consent form has been signed and visualized and is completed in its entirety by all involved parties. Actual scanning of the paper consent into the chart takes place at a later date and is a process that I am not involved in nor can be held responsible for.       Caroline Miller, RODRIGO - CNP  12/20/2022 10:46 PM

## 2022-12-21 NOTE — CARE COORDINATION
Pt presented to [facility] ED c/o chest pain. Was subsequently transferred to [facility] for continuation of care. Has a PMHx of tobacco abuse, headaches and prediabetes. At baseline, pt is indep with his ADLs. Works [employer]. On RA. Denies home care services. Does not have a PCP but was agreeable to Count includes the [facility] referral-done. Medicaid insurance verified. Able to afford home meds. Will continue to follow. 12/21/22 1016   Service Assessment   Patient Orientation Alert and Oriented   Cognition Alert   History Provided By Patient   Primary Caregiver Self   Support Systems Spouse/Significant Other;Family Members;Friends/Neighbors   PCP Verified by CM No  (Referral made to Count includes the [facility] for PCP f/u)   Prior Functional Level Independent in ADLs/IADLs   Current Functional Level Independent in ADLs/IADLs   Can patient return to prior living arrangement Yes   Ability to make needs known: Good   Family able to assist with home care needs: Yes   Would you like for me to discuss the discharge plan with any other family members/significant others, and if so, who? Yes   Financial Resources Medicaid   CM/ Referral No PCP   Social/Functional History   Type of [address] One level   ADL Assistance Independent   Ambulation Assistance Independent   Active  Yes   Mode of Transportation Car   Occupation Full time employment   Discharge Planning   Type of Residence House   Current Services Prior To Admission None   Potential Assistance Needed N/A   DME Ordered? No   Potential Assistance Purchasing Medications No   Type of Home Care Services None   Patient expects to be discharged to: [facility] Discharge   Transition of Care Consult (CM Consult) Discharge [facility]90 Discharge None   [facility] Information Provided?  No   Mode of Transport at Discharge Other (see comment)  (Family)   Confirm Follow Up Transport Self

## 2022-12-21 NOTE — PLAN OF CARE
Problem: Discharge Planning  Goal: Discharge to home or other facility with appropriate resources  Outcome: Progressing  Flowsheets (Taken 12/21/2022 9226)  Discharge to home or other facility with appropriate resources:   Identify barriers to discharge with patient and caregiver   Arrange for needed discharge resources and transportation as appropriate   Identify discharge learning needs (meds, wound care, etc)

## 2022-12-21 NOTE — PROGRESS NOTES
TRANSFER - IN REPORT:    Verbal report received from Nataliia Nance RN on Morgan Grayson  being received from  ED for routine progression of patient care      Report consisted of patient's Situation, Background, Assessment and   Recommendations(SBAR). Information from the following report(s) ED Encounter Summary, ED SBAR, [facility], and Recent Results was reviewed with the receiving nurse. Opportunity for questions and clarification was provided. Assessment completed upon patient's arrival to unit and care assumed.

## 2022-12-21 NOTE — PROGRESS NOTES
TRANSFER - OUT REPORT:    Verbal report given to [facility] RN on Magui Mayo  being transferred to [facility] for routine progression of patient care       Report consisted of patient's Situation, Background, Assessment and   Recommendations(SBAR). Information from the following report(s) Nurse Handoff Report was reviewed with the receiving nurse. [place] Assessment: Presents to emergency department  because of falls (Syncope, seizure, or loss of consciousness): No, Age > 79: No, Altered Mental Status, Intoxication with alcohol or substance confusion (Disorientation, impaired judgment, poor safety awaremess, or inability to follow instructions): No, Impaired Mobility: Ambulates or transfers with assistive devices or assistance; Unable to ambulate or transer.: No  Lines:   Peripheral IV 12/20/22 Left Antecubital (Active)   Site Assessment Clean, dry & intact 12/21/22 0814   Line Status Infusing 12/21/22 0814   Line Care Connections checked and tightened 12/21/22 0814   Phlebitis Assessment No symptoms 12/21/22 0814   Infiltration Assessment 0 12/21/22 0814   Alcohol Cap Used Yes 12/21/22 0814   Dressing Status Clean, dry & intact 12/21/22 0814   Dressing Type Transparent 12/21/22 0814        Opportunity for questions and clarification was provided. Patient transported with:  O2 @ 2lpm    Heparin gtt to restart 2 hours after TR band off.

## 2022-12-21 NOTE — PROGRESS NOTES
TRANSFER - OUT REPORT:    [facility] with Dr. Jaylin Oseguera to return to [address] [place] [address] 12/22 for staged PCI to cx/OM bifurcation with no sedation    R radial approach  R band with 14mL    Versed 4mg IV  Heparin 5000units total    Verbal report given to Day Girard RN on Anjelica Divers  being transferred to [facility] for routine progression of patient care       Report consisted of patient's Situation, Background, Assessment and   Recommendations(SBAR). Information from the following report(s) Nurse Handoff Report and MAR was reviewed with the receiving nurse.

## 2022-12-21 NOTE — PROGRESS NOTES
am  12/21/2022 6:47 AM    Admit Date: 12/20/2022    Admit Diagnosis: Chest pain [R07.9]      Subjective:    Patient : Patient presents with what clinically at this point is a type I troponin leak non-STEMI. We will optimize medical therapy in preparation for left heart cath possible    Objective:    BP (!) 128/93   Pulse 87   Temp 98.4 °F (36.9 °C)   Resp 18   Ht 6' (1.829 m)   Wt 267 lb 6.7 oz (121.3 kg)   SpO2 95%   BMI 36.27 kg/m²     ROS:  General ROS: negative for - chills  Hematological and Lymphatic ROS: negative for - blood clots or jaundice  Respiratory ROS: no cough, shortness of breath, or wheezing  Cardiovascular ROS: no chest pain or dyspnea on exertion  Gastrointestinal ROS: no abdominal pain, change in bowel habits, or black or bloody stools  Neurological ROS: no TIA or stroke symptoms    Physical Exam:      Physical Examination: General appearance - Appearance: alert, well appearing, and in no distress.    Neck/lymph - supple, no significant adenopathy  Chest/CV - clear to auscultation, no wheezes, rales or rhonchi, symmetric air entry  Heart - normal rate, regular rhythm, normal S1, S2, no murmurs, rubs, clicks or gallops  Abdomen/GI - soft, nontender, nondistended, no masses or organomegaly   Musculoskeletal - no joint tenderness, deformity or swelling  Extremities - peripheral pulses normal, no pedal edema, no clubbing or cyanosis  Skin - normal coloration and turgor, no rashes, no suspicious skin lesions noted    Current Facility-Administered Medications   Medication Dose Route Frequency    metoprolol succinate (TOPROL XL) extended release tablet 50 mg  50 mg Oral Daily    losartan (COZAAR) tablet 25 mg  25 mg Oral Daily    rosuvastatin (CRESTOR) tablet 40 mg  40 mg Oral Nightly    colchicine (COLCRYS) tablet 0.6 mg  0.6 mg Oral Daily    0.9 % sodium chloride infusion   IntraVENous Continuous    sodium chloride flush 0.9 % injection 5-40 mL  5-40 mL IntraVENous 2 times per day    sodium chloride flush 0.9 % injection 5-40 mL  5-40 mL IntraVENous PRN    ondansetron (ZOFRAN) injection 4 mg  4 mg IntraVENous Q4H PRN    acetaminophen (TYLENOL) tablet 650 mg  650 mg Oral Q6H PRN    Or    acetaminophen (TYLENOL) suppository 650 mg  650 mg Rectal Q6H PRN    polyethylene glycol (GLYCOLAX) packet 17 g  17 g Oral Daily PRN    aspirin chewable tablet 81 mg  81 mg Oral Daily    nitroGLYCERIN (NITROSTAT) SL tablet 0.4 mg  0.4 mg SubLINGual Q5 Min PRN    potassium chloride (KLOR-CON M) extended release tablet 40 mEq  40 mEq Oral PRN    Or    potassium bicarb-citric acid (EFFER-K) effervescent tablet 40 mEq  40 mEq Oral PRN    Or    potassium chloride 10 mEq/100 mL IVPB (Peripheral Line)  10 mEq IntraVENous PRN    magnesium sulfate 2000 mg in 50 mL IVPB premix  2,000 mg IntraVENous PRN    nitroglycerin (NITRO-BID) 2 % ointment 1 inch  1 inch Topical Q6H PRN    heparin (porcine) injection 4,000 Units  4,000 Units IntraVENous PRN    heparin (porcine) injection 2,000 Units  2,000 Units IntraVENous PRN    heparin 25,000 units in dextrose 5% 250 mL (premix) infusion  5-30 Units/kg/hr IntraVENous Continuous    HYDROcodone-acetaminophen (NORCO) 5-325 MG per tablet 1 tablet  1 tablet Oral Q6H PRN    morphine injection 2 mg  2 mg IntraVENous Q4H PRN    amoxicillin-clavulanate (AUGMENTIN) 875-125 MG per tablet 1 tablet  1 tablet Oral 2 times per day       Data Review: data included in this note has been independently reviewed by the author     TELEMETRY: Normal sinus rhythm    Assessment/Plan:     Patient Active Problem List   Diagnosis    Chest pain    Tobacco abuse    Hypertension    Elevated glucose     PLAN  Non-STEMI  Optimize medications with beta-blocker ARB anti-inflammatory lipid management and plan for heart cath    Pt set up for procedure. Risks benefits and alternatives discussed. Pt agrees to proceed.  Risks of bleeding infection emergent surgical procedure loss of life or limb renal failure and other known risks discussed. Pt agrees to proceed and agrees to sign consent form. Current consent form has been signed and visualized and is completed in its entirety by all involved parties. Actual scanning of the paper consent into the chart takes place at a later date and is a process that I am not involved in nor can be held responsible for.           Gorge Pratt MD

## 2022-12-21 NOTE — PROGRESS NOTES
TRANSFER - IN REPORT:    Verbal report received from Ann-Marie on Michial Pilling  being received from [facility] for routine progression of patient care      Report consisted of patient's Situation, Background, Assessment and   Recommendations(SBAR). Information from the following report(s) Index, Med Rec Status, and Cardiac Rhythm NSR  was reviewed with the receiving nurse. Opportunity for questions and clarification was provided. Assessment completed upon patient's arrival to unit and care assumed.

## 2022-12-21 NOTE — PROGRESS NOTES
Dual skin assessment completed with 2nd RN Antonio Uribe) and reveals the following: No changes made to previous skin note with the exception of R Radial s/p LHC with a TR Band present clean,dry, and intact. Heart monitor reapplied.

## 2022-12-21 NOTE — ED NOTES
TRANSFER - OUT REPORT:    Verbal report given to Marcio Grayson  being transferred to [facility] for routine progression of patient care       Report consisted of patient's Situation, Background, Assessment and   Recommendations(SBAR). Information from the following report(s) Nurse Handoff Report was reviewed with the receiving nurse. Lines:   Peripheral IV 12/20/22 Left Antecubital (Active)   Site Assessment Clean, dry & intact 12/20/22 1514   Line Status Brisk blood return 12/20/22 1514   Phlebitis Assessment No symptoms 12/20/22 1514   Infiltration Assessment 0 12/20/22 1514        Opportunity for questions and clarification was provided.       Patient transported with:  O2 @ RAlpm     Devan Marshall called for transport to [address] [facility], ETA @5529     Luisana Watt RN  12/20/22 4556

## 2022-12-22 ENCOUNTER — ANESTHESIA (OUTPATIENT)
Dept: CARDIAC CATH/INVASIVE PROCEDURES | Age: 52
DRG: 174 | End: 2022-12-22
Payer: MEDICAID

## 2022-12-22 ENCOUNTER — TELEPHONE (OUTPATIENT)
Dept: CARDIOLOGY CLINIC | Age: 52
End: 2022-12-22

## 2022-12-22 VITALS
BODY MASS INDEX: 35.12 KG/M2 | OXYGEN SATURATION: 94 % | HEIGHT: 72 IN | DIASTOLIC BLOOD PRESSURE: 71 MMHG | SYSTOLIC BLOOD PRESSURE: 122 MMHG | HEART RATE: 79 BPM | TEMPERATURE: 97.7 F | WEIGHT: 259.3 LBS | RESPIRATION RATE: 18 BRPM

## 2022-12-22 PROBLEM — I21.4 NSTEMI (NON-ST ELEVATED MYOCARDIAL INFARCTION) (HCC): Status: ACTIVE | Noted: 2022-12-22

## 2022-12-22 LAB
ANION GAP SERPL CALC-SCNC: 3 MMOL/L (ref 2–11)
BASOPHILS # BLD: 0 K/UL (ref 0–0.2)
BASOPHILS NFR BLD: 0 % (ref 0–2)
BUN SERPL-MCNC: 18 MG/DL (ref 6–23)
CALCIUM SERPL-MCNC: 9.1 MG/DL (ref 8.3–10.4)
CHLORIDE SERPL-SCNC: 107 MMOL/L (ref 101–110)
CO2 SERPL-SCNC: 28 MMOL/L (ref 21–32)
CREAT SERPL-MCNC: 1.1 MG/DL (ref 0.8–1.5)
DIFFERENTIAL METHOD BLD: ABNORMAL
ECHO BSA: 2.48 M2
EOSINOPHIL # BLD: 0.4 K/UL (ref 0–0.8)
EOSINOPHIL NFR BLD: 4 % (ref 0.5–7.8)
ERYTHROCYTE [DISTWIDTH] IN BLOOD BY AUTOMATED COUNT: 13.2 % (ref 11.9–14.6)
GLUCOSE SERPL-MCNC: 140 MG/DL (ref 65–100)
HCT VFR BLD AUTO: 50.4 % (ref 41.1–50.3)
HGB BLD-MCNC: 16.1 G/DL (ref 13.6–17.2)
IMM GRANULOCYTES # BLD AUTO: 0.1 K/UL (ref 0–0.5)
IMM GRANULOCYTES NFR BLD AUTO: 1 % (ref 0–5)
LYMPHOCYTES # BLD: 1.5 K/UL (ref 0.5–4.6)
LYMPHOCYTES NFR BLD: 16 % (ref 13–44)
MAGNESIUM SERPL-MCNC: 2.3 MG/DL (ref 1.8–2.4)
MCH RBC QN AUTO: 29 PG (ref 26.1–32.9)
MCHC RBC AUTO-ENTMCNC: 31.9 G/DL (ref 31.4–35)
MCV RBC AUTO: 90.6 FL (ref 82–102)
MONOCYTES # BLD: 1.1 K/UL (ref 0.1–1.3)
MONOCYTES NFR BLD: 12 % (ref 4–12)
NEUTS SEG # BLD: 6.4 K/UL (ref 1.7–8.2)
NEUTS SEG NFR BLD: 67 % (ref 43–78)
NRBC # BLD: 0 K/UL (ref 0–0.2)
PLATELET # BLD AUTO: 302 K/UL (ref 150–450)
PLATELET COMMENT: ADEQUATE
PMV BLD AUTO: 9.5 FL (ref 9.4–12.3)
POTASSIUM SERPL-SCNC: 4.5 MMOL/L (ref 3.5–5.1)
RBC # BLD AUTO: 5.56 M/UL (ref 4.23–5.6)
RBC MORPH BLD: ABNORMAL
SODIUM SERPL-SCNC: 138 MMOL/L (ref 133–143)
UFH PPP CHRO-ACNC: 0.36 IU/ML (ref 0.3–0.7)
UFH PPP CHRO-ACNC: <0.1 IU/ML (ref 0.3–0.7)
WBC # BLD AUTO: 9.5 K/UL (ref 4.3–11.1)

## 2022-12-22 PROCEDURE — 1100000003 HC PRIVATE W/ TELEMETRY

## 2022-12-22 PROCEDURE — C1874 STENT, COATED/COV W/DEL SYS: HCPCS | Performed by: INTERNAL MEDICINE

## 2022-12-22 PROCEDURE — C9601 PERC DRUG-EL COR STENT BRAN: HCPCS | Performed by: INTERNAL MEDICINE

## 2022-12-22 PROCEDURE — C1769 GUIDE WIRE: HCPCS | Performed by: INTERNAL MEDICINE

## 2022-12-22 PROCEDURE — C1894 INTRO/SHEATH, NON-LASER: HCPCS | Performed by: INTERNAL MEDICINE

## 2022-12-22 PROCEDURE — 6370000000 HC RX 637 (ALT 250 FOR IP): Performed by: INTERNAL MEDICINE

## 2022-12-22 PROCEDURE — C1725 CATH, TRANSLUMIN NON-LASER: HCPCS | Performed by: INTERNAL MEDICINE

## 2022-12-22 PROCEDURE — 6370000000 HC RX 637 (ALT 250 FOR IP): Performed by: NURSE PRACTITIONER

## 2022-12-22 PROCEDURE — 3700000000 HC ANESTHESIA ATTENDED CARE: Performed by: INTERNAL MEDICINE

## 2022-12-22 PROCEDURE — 36415 COLL VENOUS BLD VENIPUNCTURE: CPT

## 2022-12-22 PROCEDURE — 80048 BASIC METABOLIC PNL TOTAL CA: CPT

## 2022-12-22 PROCEDURE — 2709999900 HC NON-CHARGEABLE SUPPLY: Performed by: INTERNAL MEDICINE

## 2022-12-22 PROCEDURE — 85025 COMPLETE CBC W/AUTO DIFF WBC: CPT

## 2022-12-22 PROCEDURE — 83735 ASSAY OF MAGNESIUM: CPT

## 2022-12-22 PROCEDURE — 2580000003 HC RX 258: Performed by: NURSE PRACTITIONER

## 2022-12-22 PROCEDURE — 2500000003 HC RX 250 WO HCPCS: Performed by: NURSE ANESTHETIST, CERTIFIED REGISTERED

## 2022-12-22 PROCEDURE — 6360000004 HC RX CONTRAST MEDICATION: Performed by: INTERNAL MEDICINE

## 2022-12-22 PROCEDURE — 027135Z DILATION OF CORONARY ARTERY, TWO ARTERIES WITH TWO DRUG-ELUTING INTRALUMINAL DEVICES, PERCUTANEOUS APPROACH: ICD-10-PCS | Performed by: INTERNAL MEDICINE

## 2022-12-22 PROCEDURE — 85520 HEPARIN ASSAY: CPT

## 2022-12-22 PROCEDURE — 6360000002 HC RX W HCPCS: Performed by: INTERNAL MEDICINE

## 2022-12-22 PROCEDURE — C1887 CATHETER, GUIDING: HCPCS | Performed by: INTERNAL MEDICINE

## 2022-12-22 PROCEDURE — 2500000003 HC RX 250 WO HCPCS: Performed by: INTERNAL MEDICINE

## 2022-12-22 PROCEDURE — 3700000001 HC ADD 15 MINUTES (ANESTHESIA): Performed by: INTERNAL MEDICINE

## 2022-12-22 PROCEDURE — 2580000003 HC RX 258: Performed by: INTERNAL MEDICINE

## 2022-12-22 PROCEDURE — C9600 PERC DRUG-EL COR STENT SING: HCPCS | Performed by: INTERNAL MEDICINE

## 2022-12-22 PROCEDURE — 6360000002 HC RX W HCPCS: Performed by: NURSE PRACTITIONER

## 2022-12-22 PROCEDURE — 2580000003 HC RX 258: Performed by: NURSE ANESTHETIST, CERTIFIED REGISTERED

## 2022-12-22 DEVICE — STENT ONYXNG30022UX ONYX 3.00X22RX
Type: IMPLANTABLE DEVICE | Site: HEART | Status: FUNCTIONAL
Brand: ONYX FRONTIER™

## 2022-12-22 DEVICE — STENT ONYXNG30008UX ONYX 3.00X08RX
Type: IMPLANTABLE DEVICE | Site: HEART | Status: FUNCTIONAL
Brand: ONYX FRONTIER™

## 2022-12-22 RX ORDER — BIVALIRUDIN 250 MG/5ML
INJECTION, POWDER, LYOPHILIZED, FOR SOLUTION INTRAVENOUS PRN
Status: DISCONTINUED | OUTPATIENT
Start: 2022-12-22 | End: 2022-12-22 | Stop reason: HOSPADM

## 2022-12-22 RX ORDER — METOPROLOL SUCCINATE 100 MG/1
100 TABLET, EXTENDED RELEASE ORAL DAILY
Qty: 30 TABLET | Refills: 3 | Status: SHIPPED | OUTPATIENT
Start: 2022-12-23

## 2022-12-22 RX ORDER — ROSUVASTATIN CALCIUM 40 MG/1
40 TABLET, COATED ORAL NIGHTLY
Qty: 30 TABLET | Refills: 3 | Status: SHIPPED | OUTPATIENT
Start: 2022-12-22

## 2022-12-22 RX ORDER — SODIUM CHLORIDE, SODIUM LACTATE, POTASSIUM CHLORIDE, CALCIUM CHLORIDE 600; 310; 30; 20 MG/100ML; MG/100ML; MG/100ML; MG/100ML
INJECTION, SOLUTION INTRAVENOUS CONTINUOUS PRN
Status: DISCONTINUED | OUTPATIENT
Start: 2022-12-22 | End: 2022-12-22 | Stop reason: SDUPTHER

## 2022-12-22 RX ORDER — METOPROLOL SUCCINATE 100 MG/1
100 TABLET, EXTENDED RELEASE ORAL DAILY
Qty: 30 TABLET | Refills: 3 | Status: SHIPPED | OUTPATIENT
Start: 2022-12-23 | End: 2022-12-22 | Stop reason: SDUPTHER

## 2022-12-22 RX ORDER — NITROGLYCERIN 0.4 MG/1
0.4 TABLET SUBLINGUAL EVERY 5 MIN PRN
Qty: 25 TABLET | Refills: 3 | Status: SHIPPED | OUTPATIENT
Start: 2022-12-22 | End: 2022-12-22 | Stop reason: SDUPTHER

## 2022-12-22 RX ORDER — METOPROLOL SUCCINATE 100 MG/1
100 TABLET, EXTENDED RELEASE ORAL DAILY
Status: DISCONTINUED | OUTPATIENT
Start: 2022-12-22 | End: 2022-12-22 | Stop reason: HOSPADM

## 2022-12-22 RX ORDER — ASPIRIN 81 MG/1
81 TABLET, CHEWABLE ORAL DAILY
Qty: 30 TABLET | Refills: 3 | Status: SHIPPED | OUTPATIENT
Start: 2022-12-23

## 2022-12-22 RX ORDER — HEPARIN SODIUM 200 [USP'U]/100ML
INJECTION, SOLUTION INTRAVENOUS CONTINUOUS PRN
Status: DISCONTINUED | OUTPATIENT
Start: 2022-12-22 | End: 2022-12-22 | Stop reason: HOSPADM

## 2022-12-22 RX ORDER — DEXMEDETOMIDINE HYDROCHLORIDE 100 UG/ML
INJECTION, SOLUTION INTRAVENOUS PRN
Status: DISCONTINUED | OUTPATIENT
Start: 2022-12-22 | End: 2022-12-22 | Stop reason: SDUPTHER

## 2022-12-22 RX ORDER — LOSARTAN POTASSIUM 50 MG/1
50 TABLET ORAL DAILY
Status: DISCONTINUED | OUTPATIENT
Start: 2022-12-22 | End: 2022-12-22 | Stop reason: HOSPADM

## 2022-12-22 RX ORDER — LIDOCAINE HYDROCHLORIDE 10 MG/ML
INJECTION, SOLUTION INFILTRATION; PERINEURAL PRN
Status: DISCONTINUED | OUTPATIENT
Start: 2022-12-22 | End: 2022-12-22 | Stop reason: HOSPADM

## 2022-12-22 RX ORDER — DEXMEDETOMIDINE HYDROCHLORIDE 4 UG/ML
INJECTION, SOLUTION INTRAVENOUS CONTINUOUS PRN
Status: DISCONTINUED | OUTPATIENT
Start: 2022-12-22 | End: 2022-12-22 | Stop reason: SDUPTHER

## 2022-12-22 RX ORDER — LOSARTAN POTASSIUM 50 MG/1
50 TABLET ORAL DAILY
Qty: 30 TABLET | Refills: 3 | Status: SHIPPED | OUTPATIENT
Start: 2022-12-23 | End: 2022-12-22 | Stop reason: SDUPTHER

## 2022-12-22 RX ORDER — NITROGLYCERIN 0.4 MG/1
0.4 TABLET SUBLINGUAL EVERY 5 MIN PRN
Qty: 25 TABLET | Refills: 3 | Status: SHIPPED | OUTPATIENT
Start: 2022-12-22

## 2022-12-22 RX ORDER — ROSUVASTATIN CALCIUM 40 MG/1
40 TABLET, COATED ORAL NIGHTLY
Qty: 30 TABLET | Refills: 3 | Status: SHIPPED | OUTPATIENT
Start: 2022-12-22 | End: 2022-12-22 | Stop reason: SDUPTHER

## 2022-12-22 RX ORDER — LOSARTAN POTASSIUM 50 MG/1
50 TABLET ORAL DAILY
Qty: 30 TABLET | Refills: 3 | Status: SHIPPED | OUTPATIENT
Start: 2022-12-23

## 2022-12-22 RX ORDER — COLCHICINE 0.6 MG/1
0.6 TABLET ORAL DAILY
Qty: 30 TABLET | Refills: 0 | Status: SHIPPED | OUTPATIENT
Start: 2022-12-23

## 2022-12-22 RX ORDER — COLCHICINE 0.6 MG/1
0.6 TABLET ORAL DAILY
Qty: 30 TABLET | Refills: 0 | Status: SHIPPED | OUTPATIENT
Start: 2022-12-23 | End: 2022-12-22 | Stop reason: SDUPTHER

## 2022-12-22 RX ORDER — NITROGLYCERIN 20 MG/100ML
INJECTION INTRAVENOUS CONTINUOUS PRN
Status: COMPLETED | OUTPATIENT
Start: 2022-12-22 | End: 2022-12-22

## 2022-12-22 RX ADMIN — SODIUM CHLORIDE, PRESERVATIVE FREE 10 ML: 5 INJECTION INTRAVENOUS at 08:31

## 2022-12-22 RX ADMIN — DEXMEDETOMIDINE HYDROCHLORIDE 1.5 MCG/KG/HR: 4 INJECTION, SOLUTION INTRAVENOUS at 09:43

## 2022-12-22 RX ADMIN — COLCHICINE 0.6 MG: 0.6 TABLET, FILM COATED ORAL at 08:28

## 2022-12-22 RX ADMIN — AMOXICILLIN AND CLAVULANATE POTASSIUM 1 TABLET: 875; 125 TABLET, FILM COATED ORAL at 08:27

## 2022-12-22 RX ADMIN — ASPIRIN 81 MG: 81 TABLET, CHEWABLE ORAL at 08:27

## 2022-12-22 RX ADMIN — HEPARIN SODIUM 4000 UNITS: 1000 INJECTION INTRAVENOUS; SUBCUTANEOUS at 02:20

## 2022-12-22 RX ADMIN — LOSARTAN POTASSIUM 50 MG: 50 TABLET, FILM COATED ORAL at 08:28

## 2022-12-22 RX ADMIN — SODIUM CHLORIDE: 9 INJECTION, SOLUTION INTRAVENOUS at 05:33

## 2022-12-22 RX ADMIN — TICAGRELOR 90 MG: 90 TABLET ORAL at 05:32

## 2022-12-22 RX ADMIN — SODIUM CHLORIDE, SODIUM LACTATE, POTASSIUM CHLORIDE, AND CALCIUM CHLORIDE: 600; 310; 30; 20 INJECTION, SOLUTION INTRAVENOUS at 09:41

## 2022-12-22 RX ADMIN — METOPROLOL SUCCINATE 100 MG: 100 TABLET, EXTENDED RELEASE ORAL at 08:28

## 2022-12-22 RX ADMIN — DEXMEDETOMIDINE 80 MCG: 100 INJECTION, SOLUTION, CONCENTRATE INTRAVENOUS at 09:43

## 2022-12-22 ASSESSMENT — PAIN SCALES - GENERAL
PAINLEVEL_OUTOF10: 0
PAINLEVEL_OUTOF10: 0

## 2022-12-22 ASSESSMENT — PAIN - FUNCTIONAL ASSESSMENT
PAIN_FUNCTIONAL_ASSESSMENT: NONE - DENIES PAIN
PAIN_FUNCTIONAL_ASSESSMENT: NONE - DENIES PAIN

## 2022-12-22 NOTE — PROGRESS NOTES
TRANSFER - OUT REPORT:    PCI/LHC w/ Dr. Asher Nuno  Stent to OM x2  R radial access  TR band to R radial @ 10mL  No s/sxs of bleeding or hematoma to R radial access site    Heparin 5000 units  Anesthesia at bedside for sedation  Angiomax to until infuse until completion    Verbal report given to Jeniffer Marin RN on Bianca Vuonglls  being transferred to [address] [place] [address] for routine progression of patient care       Report consisted of patient's Situation, Background, Assessment and   Recommendations(SBAR). Information from the following report(s) Nurse Handoff Report and MAR was reviewed with the receiving nurse. [place] Assessment: Presents to emergency department  because of falls (Syncope, seizure, or loss of consciousness): No, Age > 79: No, Altered Mental Status, Intoxication with alcohol or substance confusion (Disorientation, impaired judgment, poor safety awaremess, or inability to follow instructions): No, Impaired Mobility: Ambulates or transfers with assistive devices or assistance; Unable to ambulate or transer.: No  Lines:   Peripheral IV 12/20/22 Left Antecubital (Active)   Site Assessment Clean, dry & intact 12/22/22 0415   Line Status Infusing 12/22/22 0415   Line Care Connections checked and tightened 12/22/22 0415   Phlebitis Assessment No symptoms 12/22/22 0415   Infiltration Assessment 0 12/22/22 0415   Alcohol Cap Used Yes 12/22/22 0415   Dressing Status Clean, dry & intact 12/22/22 0415   Dressing Type Transparent 12/22/22 0415        Opportunity for questions and clarification was provided.       Patient transported with:  Registered Nurse and [employer]

## 2022-12-22 NOTE — PROGRESS NOTES
TRANSFER - IN REPORT:    Verbal report received from Mingo Jones on [facility]  being received from [address] [place] [address] for routine progression of patient care      Report consisted of patient's Situation, Background, Assessment and   Recommendations(SBAR). Information from the following report(s) Med Rec Status and Cardiac Rhythm NSR  was reviewed with the receiving nurse. Opportunity for questions and clarification was provided. Assessment completed upon patient's arrival to unit and care assumed.

## 2022-12-22 NOTE — PROGRESS NOTES
Radial compression band removed at 1459 after slowly reducing air from 12 cc to zero as per hospital protocol. No bleeding or hematoma noted. 2 x 2 gauze with tegaderm placed over puncture site. The affected extremity is warm and dry to the touch. Frequent vital signs printed and placed on bedside chart. Patient instructed to call if any bleeding noted on gauze. Patient verbalized understanding the nursing instructions.

## 2022-12-22 NOTE — PROGRESS NOTES
Dual skin assessment completed with 2nd RN (daniele) and reveals the following: no changes to previous skin assessment except a R Radial  incision site s/p LHC with a TR Band present. Site is clean,dry, and intact.

## 2022-12-22 NOTE — PROGRESS NOTES
TRANSFER - IN REPORT:    Verbal report received from [facility] on Azeb Khan  being received from [facility] for routine progression of patient care      Report consisted of patient's Situation, Background, Assessment and   Recommendations(SBAR). Information from the following report(s) Nurse Handoff Report and Cardiac Rhythm NSR  was reviewed with the receiving nurse. PCI/LHC w/ Dr. Johann Graham  Stent to OM x2  R radial access  TR band to R radial @ 10mL  No s/sxs of bleeding or hematoma to R radial access site     Heparin 5000 units  Anesthesia at bedside for sedation  Angiomax to until infuse until completion    Opportunity for questions and clarification was provided. Assessment completed upon patient's arrival to unit and care assumed.

## 2022-12-22 NOTE — PLAN OF CARE
Problem: Discharge Planning  Goal: Discharge to home or other facility with appropriate resources  Outcome: Progressing  Flowsheets (Taken 12/22/2022 1315)  Discharge to home or other facility with appropriate resources:   Identify barriers to discharge with patient and caregiver   Arrange for needed discharge resources and transportation as appropriate   Identify discharge learning needs (meds, wound care, etc)

## 2022-12-22 NOTE — CARE COORDINATION
Discharge order is in. Pt is discharging home today in stable condition. No discharge needs identified. Pt was showing as self pay, however, pt provided writer his [employer] card. US had admitting scan his card and update his MR. Pt does not need medication assistance now but was given a [employer]il[employer] 30-day free card and Pt Assistance Application. No other discharge needs identified. Tx goals met. 12/22/22 1600   Services At/After Discharge   Transition of Care Consult (CM Consult) Discharge [facility] Discharge None   [place] Resource Information Provided? No   Mode of Transport at Discharge Other (see comment)  (Family)   Confirm Follow Up Transport Family   Condition of Participation: Discharge Planning   The Patient and/or Patient Representative was provided with a Choice of Provider? Patient   The Patient and/Or Patient Representative agree with the Discharge Plan? Yes   Freedom of Choice list was provided with basic dialogue that supports the patient's individualized plan of care/goals, treatment preferences, and shares the quality data associated with the providers?   Yes

## 2022-12-22 NOTE — PROGRESS NOTES
Discharge paperwork given to patient and patient wife. Both verbalized understanding of education given and all questions/concerns addressed. Iv and heart monitor removed.

## 2022-12-22 NOTE — PROGRESS NOTES
Maranda Leon is currently a patient at [facility]. He will likely be discharged in the next 24-48 hours. He has been a patient since 12/20/2022. Please excuse him from any obligations during that time period. Thank you,  Flora Jenkins,   [phone]

## 2022-12-22 NOTE — PROGRESS NOTES
am  12/22/2022 7:11 AM    Admit Date: 12/20/2022    Admit Diagnosis: Chest pain [R07.9]      Subjective:    Patient : Patient has a culprit bifurcating circumflex marginal lesion that was likely responsible for his presentation with acute coronary syndrome. During diagnostic catheterization mild sedation unmasked profound sleep apnea with severe limb movements that made the diagnostic portion of the cath extremely difficult and really intervention of the bifurcating lesion and possible. He will be brought back down today and with anesthesia assistance intervention to the circumflex marginal will be performed    Objective:    /85   Pulse 96   Temp 97.7 °F (36.5 °C) (Oral)   Resp 20   Ht 6' (1.829 m)   Wt 259 lb 4.8 oz (117.6 kg)   SpO2 96%   BMI 35.17 kg/m²     ROS:  General ROS: negative for - chills  Hematological and Lymphatic ROS: negative for - blood clots or jaundice  Respiratory ROS: no cough, shortness of breath, or wheezing  Cardiovascular ROS: no chest pain or dyspnea on exertion  Gastrointestinal ROS: no abdominal pain, change in bowel habits, or black or bloody stools  Neurological ROS: no TIA or stroke symptoms    Physical Exam:      Physical Examination: General appearance - Appearance: alert, well appearing, and in no distress.    Neck/lymph - supple, no significant adenopathy  Chest/CV - clear to auscultation, no wheezes, rales or rhonchi, symmetric air entry  Heart - normal rate, regular rhythm, normal S1, S2, no murmurs, rubs, clicks or gallops  Abdomen/GI - soft, nontender, nondistended, no masses or organomegaly   Musculoskeletal - no joint tenderness, deformity or swelling  Extremities - peripheral pulses normal, no pedal edema, no clubbing or cyanosis  Skin - normal coloration and turgor, no rashes, no suspicious skin lesions noted    Current Facility-Administered Medications   Medication Dose Route Frequency    metoprolol succinate (TOPROL XL) extended release tablet 50 mg  50 mg Oral Daily    losartan (COZAAR) tablet 25 mg  25 mg Oral Daily    rosuvastatin (CRESTOR) tablet 40 mg  40 mg Oral Nightly    colchicine (COLCRYS) tablet 0.6 mg  0.6 mg Oral Daily    ticagrelor (BRILINTA) tablet 90 mg  90 mg Oral BID    0.9 % sodium chloride infusion   IntraVENous Continuous    sodium chloride flush 0.9 % injection 5-40 mL  5-40 mL IntraVENous 2 times per day    sodium chloride flush 0.9 % injection 5-40 mL  5-40 mL IntraVENous PRN    ondansetron (ZOFRAN) injection 4 mg  4 mg IntraVENous Q4H PRN    acetaminophen (TYLENOL) tablet 650 mg  650 mg Oral Q6H PRN    Or    acetaminophen (TYLENOL) suppository 650 mg  650 mg Rectal Q6H PRN    polyethylene glycol (GLYCOLAX) packet 17 g  17 g Oral Daily PRN    aspirin chewable tablet 81 mg  81 mg Oral Daily    nitroGLYCERIN (NITROSTAT) SL tablet 0.4 mg  0.4 mg SubLINGual Q5 Min PRN    potassium chloride (KLOR-CON M) extended release tablet 40 mEq  40 mEq Oral PRN    Or    potassium bicarb-citric acid (EFFER-K) effervescent tablet 40 mEq  40 mEq Oral PRN    Or    potassium chloride 10 mEq/100 mL IVPB (Peripheral Line)  10 mEq IntraVENous PRN    magnesium sulfate 2000 mg in 50 mL IVPB premix  2,000 mg IntraVENous PRN    nitroglycerin (NITRO-BID) 2 % ointment 1 inch  1 inch Topical Q6H PRN    heparin (porcine) injection 4,000 Units  4,000 Units IntraVENous PRN    heparin (porcine) injection 2,000 Units  2,000 Units IntraVENous PRN    heparin 25,000 units in dextrose 5% 250 mL (premix) infusion  5-30 Units/kg/hr IntraVENous Continuous    HYDROcodone-acetaminophen (NORCO) 5-325 MG per tablet 1 tablet  1 tablet Oral Q6H PRN    morphine injection 2 mg  2 mg IntraVENous Q4H PRN    amoxicillin-clavulanate (AUGMENTIN) 875-125 MG per tablet 1 tablet  1 tablet Oral 2 times per day       Data Review: data included in this note has been independently reviewed by the author     TELEMETRY: Normal sinus rhythm    Assessment/Plan:     Patient Active Problem List   Diagnosis Chest pain    Tobacco abuse    Hypertension    Elevated glucose     PLAN  Coronary artery disease with acute presentation  Patient will be placed on appropriate medications and bifurcation and/or appropriate stenting of his circumflex marginal will be performed today with anesthesia guidance    Patient has dyslipidemia which was started high intensity statin therapy for    Patient has type 2 diabetes which we have started an SGLT2 inhibitor for    Patient has non-STEMI he is on dual antiplatelet therapy ARB beta-blocker and colchicine        Jean-Claude Allen MD

## 2022-12-22 NOTE — TELEPHONE ENCOUNTER
Jian Delgadillo from [facility] pharmacy called wanting to make sure it it okay to fill Rxs Crestor and colchicine. Jian Delgadillo states there is a risk when mixed together that it can cause myotrophy, muscle pain, and weakness. Maria E wants clarity on how to proceed. Maria E would appreciate a call back.

## 2022-12-22 NOTE — DISCHARGE SUMMARY
[address] Cardiology Discharge Summary     Patient ID:  Ladarius Cardona  [id number]  50 y.o.  1970    Admit date: 12/20/2022    Discharge date:  12/22/2022     Admitting Physician: Clarice Cintron MD     Discharge Physician: Dr. Xochitl Mcgregor    Admission Diagnoses: Chest pain [R07.9]  NSTEMI (non-ST elevated myocardial infarction) [facility]) [I21.4]    Discharge Diagnoses:   Patient Active Problem List    Diagnosis    NSTEMI (non-ST elevated myocardial infarction) [facility])    Chest pain    Tobacco abuse    Hypertension     Cardiology Procedures this admission:  Diagnostic left heart catheterization  Left heart catheterization with PCI  Consults: none    Hospital Course: Patient is a 50 y.o. male with past medical history of tobacco abuse, headaches, and prediabetes who presented to the ER at [facility] with complaints of chest pain. Patient reports that he has had intermittent chest pain for the past several months, however, it has gotten more frequent over the past month. He describes exertional chest pressure that is relieved with rest.      Upon arrival to the ER, EKG shows SR without acute ST/T wave changes. Serial cardiac enzymes were done and noted to be elevated but stable at 252.6 and 233.7. He was given 325mg ASA, IV morphine and started on IV heparin prior to transfer to [facility] facility. Patient was taken to cardiac catheterization by Dr. Xochitl Mcgregor. Diagnostic cath showed:     Left heart cath selective coronary angiography left ventriculogram performed via right radial artery   With Versed on board his sedation we unmasked severe sleep apnea and the patient's moved continually and unpredictably throughout the diagnostic case making even the diagnostic catheter portion very very difficult to perform. Patient will be brought back tomorrow for bifurcating intervention on the  in a nonsedated state     1.   Coronary artery disease with mild LAD disease mild RCA disease and high-grade culprit lesion in a large OM 1 vessel  2. Preserved ejection fraction with lateral wall motion abnormality consistent with non-STEMI  3. Preload with Brilinta and plan for repeat cath with intervention tomorrow in a nonsedated state so that the patient can be cooperative and the spontaneous erratic movements can be minimized. The patient ripped his arm up from the armboard on at least 3 different occasions causing severe movement of the catheter and his intervention will require more delicate placement of stents and cannot be safely done with him sedated    ECHO showed:   Left Ventricle Normal left ventricular systolic function with a visually estimated EF of 60 - 65%. Left ventricle size is normal. Mildly increased wall thickness. Normal wall motion. Abnormal diastolic function. Left Atrium Left atrium is mildly dilated. Right Ventricle Right ventricle size is normal. Normal wall thickness. Normal systolic function. Right Atrium Right atrium size is normal.   Aortic Valve Valve structure is normal. No regurgitation. No stenosis. Mitral Valve Valve structure is normal. Mild regurgitation. No stenosis noted. Tricuspid Valve Valve structure is normal. Mild regurgitation. No stenosis noted. Unable to assess RVSP due to inadequate or insignificant tricuspid regurgitation. Pulmonic Valve Valve structure is normal. Mild regurgitation. No stenosis noted. Aorta Normal sized annulus, sinus of Valsalva, ascending aorta, aortic arch, descending aorta and abdominal aorta. Moderately dilated aortic root. Ao root diameter is 4.1 cm. IVC/Hepatic Veins IVC diameter is less than or equal to 21 mm and decreases greater than 50% during inspiration; therefore the estimated right atrial pressure is normal (~3 mmHg). IVC size is normal.   Pericardium No pericardial effusion. The following morning patient was taken back to the cath lab with anesthesia assistance.      PCI of the circumflex marginal culprit stenosis was performed with bifurcation stenting  Bifurcation stenting of the OM with a 3 oh by 22 stent from the proximal OM into the second branch of this vessel and side stenting in a T fashion of the first branch of the OM with a 3 oh by 8 both of these were [place] drug-eluting stents. Patient tolerated the procedure well and was taken to the telemetry floor for recovery. On the afternoon of discharge, patient was up feeling well without any complaints of chest pain or shortness of breath. Patient's right radial cath site was clean, dry and intact without hematoma or bruit. Patient's labs were stable. [facility] and determined stable and ready for discharge. Patient was instructed on the importance of medication compliance including taking aspirin and Brilinta everyday without missing a dose. After receiving drug eluting stents, the patient will remain on dual anti-platelet therapy for 1 year. For maximized medical therapy for CAD, patient will continue BB, ARB, and high intensity statin as well. The patient will follow up with [facility] Cardiology -- Dr. Luis Ibrahim on Dec 27th at 2:45 pm in [facility]. Patient has been referred to cardiac rehab. DISPOSITION: The patient is being discharged home in stable condition on a low saturated fat, low cholesterol and low salt diet. The patient is instructed to advance activities as tolerated to the limit of fatigue or shortness of breath. The patient is instructed to avoid all heavy lifting, straining, stooping or squatting for 3-5 days. The patient is instructed to watch the cath site for bleeding/oozing; if seen, the patient is instructed to apply firm pressure with a clean cloth and call [facility] Cardiology at [phone]. The patient is instructed to watch for signs of infection which include: increasing area of redness, fever/hot to touch or purulent drainage at the catheterization site.  The patient is instructed not to soak in a bathtub for 7-10 days, but is cleared to shower. The patient is instructed to call the office or return to the ER for immediate evaluation for any shortness of breath or chest pain not relieved by NTG. Discharge Exam: Patient has been seen by Dr. Ari Cardona: see his progress note for exam details.     /81   Pulse 70   Temp 97 °F (36.1 °C) (Skin)   Resp 21   Ht 6' (1.829 m)   Wt 259 lb 4.8 oz (117.6 kg)   SpO2 95%   BMI 35.17 kg/m²     Recent Results (from the past 24 hour(s))   Anti-Xa, Unfractionated Heparin    Collection Time: 12/21/22 11:17 PM   Result Value Ref Range    Anti-XA Unfrac Heparin <0.10 (L) 0.3 - 0.7 IU/mL   CBC with Auto Differential    Collection Time: 12/22/22  1:00 AM   Result Value Ref Range    WBC 9.5 4.3 - 11.1 K/uL    RBC 5.56 4.23 - 5.6 M/uL    Hemoglobin 16.1 13.6 - 17.2 g/dL    Hematocrit 50.4 (H) 41.1 - 50.3 %    MCV 90.6 82 - 102 FL    MCH 29.0 26.1 - 32.9 PG    MCHC 31.9 31.4 - 35.0 g/dL    RDW 13.2 11.9 - 14.6 %    Platelets 299 128 - 304 K/uL    MPV 9.5 9.4 - 12.3 FL    nRBC 0.00 0.0 - 0.2 K/uL    Differential Type AUTOMATED      Seg Neutrophils 67 43 - 78 %    Lymphocytes 16 13 - 44 %    Monocytes 12 4.0 - 12.0 %    Eosinophils % 4 0.5 - 7.8 %    Basophils 0 0.0 - 2.0 %    Immature Granulocytes 1 0.0 - 5.0 %    Segs Absolute 6.4 1.7 - 8.2 K/UL    Absolute Lymph # 1.5 0.5 - 4.6 K/UL    Absolute Mono # 1.1 0.1 - 1.3 K/UL    Absolute Eos # 0.4 0.0 - 0.8 K/UL    Basophils Absolute 0.0 0.0 - 0.2 K/UL    Absolute Immature Granulocyte 0.1 0.0 - 0.5 K/UL    RBC Comment SLIGHT  ANISOCYTOSIS + POIKILOCYTOSIS        Platelet Comment ADEQUATE     Basic Metabolic Panel    Collection Time: 12/22/22  1:00 AM   Result Value Ref Range    Sodium 138 133 - 143 mmol/L    Potassium 4.5 3.5 - 5.1 mmol/L    Chloride 107 101 - 110 mmol/L    CO2 28 21 - 32 mmol/L    Anion Gap 3 2 - 11 mmol/L    Glucose 140 (H) 65 - 100 mg/dL    BUN 18 6 - 23 MG/DL    Creatinine 1.10 0.8 - 1.5 MG/DL    Est, Ch[place] Filt Rate >60 >60 ml/min/1.73m2    Calcium 9.1 8.3 - 10.4 MG/DL   Magnesium    Collection Time: 12/22/22  1:00 AM   Result Value Ref Range    Magnesium 2.3 1.8 - 2.4 mg/dL   Anti-Xa, Unfractionated Heparin    Collection Time: 12/22/22  8:24 AM   Result Value Ref Range    Anti-XA Unfrac Heparin 0.36 0.3 - 0.7 IU/mL   Cardiac procedure    Collection Time: 12/22/22 10:31 AM   Result Value Ref Range    Body Surface Area 2.48 m2     Patient Instructions:   Current Discharge Medication List        START taking these medications    Details   aspirin 81 MG chewable tablet Take 1 tablet by mouth daily  Qty: 30 tablet, Refills: 3      colchicine (COLCRYS) 0.6 MG tablet Take 1 tablet by mouth daily  Qty: 30 tablet, Refills: 0      losartan (COZAAR) 50 MG tablet Take 1 tablet by mouth daily  Qty: 30 tablet, Refills: 3      metoprolol succinate (TOPROL XL) 100 MG extended release tablet Take 1 tablet by mouth daily  Qty: 30 tablet, Refills: 3      nitroGLYCERIN (NITROSTAT) 0.4 MG SL tablet Place 1 tablet under the tongue every 5 minutes as needed for Chest pain up to max of 3 total doses. If no relief after 1 dose, call 911.   Qty: 25 tablet, Refills: 3      rosuvastatin (CRESTOR) 40 MG tablet Take 1 tablet by mouth nightly  Qty: 30 tablet, Refills: 3      ticagrelor (BRILINTA) 90 MG TABS tablet Take 1 tablet by mouth 2 times daily  Qty: 60 tablet, Refills: 11           CONTINUE these medications which have NOT CHANGED    Details   phenylephrine-APAP-guaiFENesin (TYLENOL SINUS SEVERE) 5-325-200 MG TABS Take 2 tablets by mouth every 4 hours as needed      aspirin-acetaminophen-caffeine (EXCEDRIN MIGRAINE) 250-250-65 MG per tablet Take 1 tablet by mouth every 6 hours as needed for Headaches      amoxicillin (AMOXIL) 500 MG capsule Take 500 mg by mouth 3 times daily                 Signed:  REBECCA Zambrano  12/22/2022  1:50 PM

## 2022-12-22 NOTE — ANESTHESIA POSTPROCEDURE EVALUATION
Department of Anesthesiology  Postprocedure Note    Patient: Ladarius Cardona  MRN: [id number]  YOB: 1970  Date of evaluation: 12/22/2022      Procedure Summary     Date: 12/22/22 Room / Location: [facility] CATH 2 ALL EVENTS / [facility] CARDIAC CATH LAB    Anesthesia Start: 3216 Anesthesia Stop: 0896    Procedure: PERCUTANEOUS CORONARY INTERVENTION Diagnosis:       CAD (coronary artery disease)      (CAD (coronary artery disease) [I25.10])    Providers: Clarice Cintron MD Responsible Provider: Karle Goodell, MD    Anesthesia Type: TIVA ASA Status: 4          Anesthesia Type: TIVA    Navi Phase I:      Navi Phase II:        Anesthesia Post Evaluation    Patient location during evaluation: bedside  Patient participation: complete - patient participated  Level of consciousness: awake  Airway patency: patent  Nausea & Vomiting: no nausea  Complications: no  Cardiovascular status: hemodynamically stable  Respiratory status: acceptable and nonlabored ventilation  Hydration status: stable  Multimodal analgesia pain management approach

## 2022-12-22 NOTE — PROGRESS NOTES
[facility] 3 TELEMETRY  [address]  Phone: [phone]             December 22, 2022    Patient: Suma Kaur   YOB: 1970   Date of Visit: 12/20/2022       To Whom It May Concern:    Suma Kaur was seen and treated in our facility  beginning 12/20/2022 until 12/22/2022. He may return to work on 12/30/2022 .       Sincerely,       REBECCA Maurer

## 2022-12-22 NOTE — DISCHARGE INSTRUCTIONS
The patient is being discharged home in stable condition on a low saturated fat, low cholesterol and low salt diet. The patient is instructed to advance activities as tolerated to the limit of fatigue or shortness of breath. The patient is instructed to avoid all heavy lifting, straining, stooping or squatting for 3-5 days. The patient is instructed to watch the cath site for bleeding/oozing; if seen, the patient is instructed to apply firm pressure with a clean cloth and call [facility] at [phone]. The patient is instructed to watch for signs of infection which include: increasing area of redness, fever/hot to touch or purulent drainage at the catheterization site. The patient is instructed not to soak in a bathtub for 7-10 days, but is cleared to shower. The patient is instructed to call the office or return to the ER for immediate evaluation for any shortness of breath or chest pain not relieved by NTG. No lifting of 10 lbs or more for 7 days, no tub baths for a week, may shower, no creams, lotions powders, or ointments to site for a week. Learning About Coronary Angioplasty  What is a coronary angioplasty? Coronary angioplasty is a procedure that uses a thin tube called a catheter to open a blocked or narrowed coronary artery. Coronary arteries are the blood vessels that bring oxygen to the heart muscle. Angioplasty also may be called percutaneous coronary intervention (PCI). Angioplasty can widen an artery that has been narrowed by fatty buildup (plaque) or blocked by a blood clot. The procedure helps blood flow more normally to the heart muscle. How is the procedure done? Coronary angioplasty is done in a cardiac catheterization laboratory (\"cath lab\"). It is done by a heart specialist called a cardiologist. The whole procedure may take 1½ to 3 hours. You lie on a table under a large X-ray machine. You will get medicine through an IV in one of your veins.  It helps you relax and not feel pain. You will be awake during the procedure. But you may not be able to remember much about it. The doctor will inject some medicine into your wrist or groin to numb the skin. You will feel a small needle poke you. It's like having a blood test. You may feel some pressure when the doctor puts in the catheter. But you will not feel pain. The doctor will look at X-ray pictures on a monitor (like a TV screen) to move the catheter to your heart. The doctor then puts a dye into the catheter. This makes your heart's arteries show up on a screen. The doctor can then see any arteries that are blocked or narrowed. You may feel warm or flushed for a short time when the doctor injects dye into your artery. If you have a blocked or narrow artery, the doctor uses a catheter with a tiny balloon at the tip. The doctor puts it into the blocked or narrow area and inflates it. The balloon presses the fatty buildup against the walls of the artery. This buildup is called plaque. This creates more room for blood to flow. In most cases, the doctor then puts a stent in the artery. A stent is a small, expandable tube. It presses against the walls of the artery. The stent is left in the artery to keep the artery open. This helps blood flow. The catheter is removed from your body. What happens right after the procedure? The catheter will be removed. Pressure may be applied to the area where the catheter was put into your blood vessel. This will help prevent bleeding. A small device may also be used to close the blood vessel. You may have a bandage or a compression device on the catheter site. After the procedure, you will be taken to a room where the catheter site and your heart rate, blood pressure, and temperature will be checked several times. If the catheter was put in your groin, you will need to lie still and keep your leg straight for up to a few hours.  If the catheter was put in your wrist, you may need to keep your arm still for at least 2 hours. You may go home the same day. Or you may stay at least 1 night in the hospital. When you go home, you will get instructions from your doctor to help you recover well and prevent problems. Make sure to drink plenty of fluids (unless your doctor tells you not to) for several hours after the procedure. This will help flush the dye out of your body. Follow-up care is a key part of your treatment and safety. Be sure to make and go to all appointments, and call your doctor if you are having problems. It's also a good idea to know your test results and keep a list of the medicines you take. Where can you learn more? Go to http://www.woods.com/ and enter M058 to learn more about \"Learning About Coronary Angioplasty. \"  Current as of: September 7, 2022               Content Version: 13.5  © 3978-3422 [employer]. Care instructions adapted under license by [facility] ([facility]). If you have questions about a medical condition or this instruction, always ask your healthcare professional. [address] any warranty or liability for your use of this information. Learning About Coronary Artery Disease (CAD)  What is coronary artery disease? Coronary artery disease is a condition that occurs when plaque builds up in the arteries that bring oxygen-rich blood to your heart. Plaque is a fatty substance made of cholesterol, calcium, and other substances in the blood. This process is called hardening of the arteries, or atherosclerosis. What happens when you have coronary artery disease? Plaque may narrow the coronary arteries. Narrowed arteries cause poor blood flow. This can lead to angina symptoms such as chest pain or discomfort. If blood flow is completely blocked, you could have a heart attack. You can slow and reduce the risk of future problems by making changes in your lifestyle.  These include quitting smoking and eating heart-healthy foods.  Treatment, along with changes in your lifestyle, can help you live a longer and healthier life. How can you prevent coronary artery disease? Do not smoke. It may be the best thing you can do to prevent coronary artery disease. If you need help quitting, talk to your doctor about stop-smoking programs and medicines. These can increase your chances of quitting for good. Be active. Try to do moderate activity at least 2½ hours a week. Or try to do vigorous activity at least 1¼ hours a week. You may want to walk or try other activities, such as running, swimming, cycling, or playing tennis or team sports. Eat heart-healthy foods. Eat more fruits and vegetables and less food that contains saturated and trans fats. Limit alcohol, sodium, and sweets. Stay at a healthy weight. Lose weight if you need to. Manage other health problems such as diabetes, high blood pressure, and high cholesterol. How is coronary artery disease treated? Your doctor will suggest that you make lifestyle changes. For example, your doctor may ask you to eat healthy foods, quit smoking, lose extra weight, and be more active. You will take medicines that help prevent a heart attack. Your doctor may suggest a procedure to open narrowed or blocked arteries. This is called angioplasty. Or your doctor may suggest using healthy blood vessels to create detours around narrowed or blocked arteries. This is called bypass surgery. Follow-up care is a key part of your treatment and safety. Be sure to make and go to all appointments, and call your doctor if you are having problems. It's also a good idea to know your test results and keep a list of the medicines you take. Where can you learn more? Go to http://www.woods.com/ and enter C643 to learn more about \"Learning About Coronary Artery Disease (CAD). \"  Current as of: September 7, 2022               Content Version: 13.5  © 8029-1925 [employer], [facility].    Care instructions adapted under license by [facility] ([facility]). If you have questions about a medical condition or this instruction, always ask your healthcare professional. [address]  any warranty or liability for your use of this information. colchicine  Pronunciation:  VERITO luna  Brand:  Amador Linda  What is the most important information I should know about colchicine? Serious drug interactions can occur when certain medicines are used together with colchicine. Tell each of your healthcare providers about all medicines you use now, and any medicine you start or stop using. What is colchicine? Colchicine affects the way the body responds to uric acid crystals, which reduces swelling and pain. Because colchicine was developed prior to federal regulations requiring FDA review of all marketed drug products, not all uses for colchicine have been approved by the FDA. The Colcrys  brand of colchicine is FDA-approved to treat or prevent gout in adults, and to treat a genetic condition called Familial Mediterranean Fever in adults and children who are at least 3years old. The Mitigare brand of colchicine is FDA-approved to prevent gout flares in adults. Generic forms of colchicine have been used to treat or prevent attacks of gout, or to treat symptoms of Behcets syndrome (such as swelling, redness, warmth, and pain). Colchicine is not a cure for gouty arthritis or Behcets syndrome, and it will not prevent these diseases from progressing. Colchicine should not be used as a routine pain medication for other conditions. Colchicine may also be used for purposes not listed in this medication guide. What should I discuss with my healthcare provider before taking colchicine? You should not use colchicine if you are allergic to it. Some medicines can cause unwanted or dangerous effects when used with colchicine, especially if you have liver or kidney disease.  Your doctor may need to change your treatment plan if you use any of the following drugs:  cyclosporine;  nefazodone;  tipranavir;  clarithromycin or telithromycin;  itraconazole or ketoconazole; or  HIV or AIDS medicine --atazanavir, darunavir, fosamprenavir, indinavir, lopinavir, nelfinavir, ritonavir, or saquinavir. To make sure colchicine is safe for you, tell your doctor if you have:  liver disease;  kidney disease; or  if you take digoxin, or cholesterol-lowering medications. It is not known whether this medicine will harm an unborn baby. Tell your doctor if you are pregnant or plan to become pregnant. Colchicine can pass into breast milk and may harm a nursing baby. Tell your doctor if you are breast-feeding a baby. How should I take colchicine? Do not purchase colchicine on the Internet or from vendors outside of the [place]. Using this medication improperly or without the advice of a doctor can result in serious side effects or death. Follow all directions on your prescription label. Do not take this medicine in larger or smaller amounts or for longer than recommended. Colchicine can be taken with or without food. To treat a gout attack, for best results take colchicine at the first sign of the attack. The longer you wait to start taking the medication, the less effective it may be. You may need to take a second lower dose of colchicine 1 hour after the first dose if you still have gout pain. Follow your doctor's instructions. Your dose will depend on the reason you are taking this medicine. Colchicine doses for gout and Mediterranean fever are different. Do not stop using colchicine unless your doctor tells you to, even if you feel fine. Call your doctor if your symptoms do not improve, or if they get worse. If you use this medicine long-term, you may need frequent medical tests. Store at room temperature away from moisture, heat, and light. Keep the bottle tightly closed when not in use.   What happens if I miss a dose? Take the missed dose as soon as you remember. Skip the missed dose if it is almost time for your next scheduled dose. Do not take extra medicine to make up the missed dose. What happens if I overdose? Seek emergency medical attention or call the Poison Help line at 1-903.613.6922. An overdose of colchicine can be fatal.  Overdose symptoms may include diarrhea, nausea, vomiting, stomach pain, muscle weakness, little or no urinating, numbness or tingling, weak pulse, slow heart rate, weak or shallow breathing, or fainting. What should I avoid while taking colchicine? Grapefruit and grapefruit juice may interact with colchicine and lead to unwanted side effects. Avoid the use of grapefruit products while taking colchicine. What are the possible side effects of colchicine? Get emergency medical help if you have signs of an allergic reaction: hives; difficult breathing; swelling of your face, lips, tongue, or throat. Call your doctor at once if you have:  muscle pain or weakness;  numbness or tingly feeling in your fingers or toes;  pale or gray appearance of your lips, tongue, or hands;  severe or ongoing vomiting or diarrhea;  fever, chills, body aches, flu symptoms; or  easy bruising, unusual bleeding, feeling weak or tired. Common side effects may include:  nausea, vomiting, stomach pain; or  diarrhea. This is not a complete list of side effects and others may occur. Call your doctor for medical advice about side effects. You may report side effects to FDA at 7-967-AKR-0850. What other drugs will affect colchicine? Many drugs can interact with colchicine, and some drugs should not be used together. This includes prescription and over-the-counter medicines, vitamins, and herbal products. Not all possible interactions are listed in this medication guide. Tell your doctor about all medicines you use, and those you start or stop using during your treatment with colchicine.  Give a list of all your medicines to any healthcare provider who treats you. Where can I get more information? Your pharmacist can provide more information about colchicine. Remember, keep this and all other medicines out of the reach of children, never share your medicines with others, and use this medication only for the indication prescribed. Every effort has been made to ensure that the information provided by [address] is accurate, up-to-date, and complete, but no guarantee is made to that effect. Drug information contained herein may be time sensitive. [facility] information has been compiled for use by healthcare practitioners and consumers in the [place] and therefore [facility] does not warrant that uses outside of the [place] are appropriate, unless specifically indicated otherwise. [facility]'s drug information does not endorse drugs, diagnose patients or recommend therapy. [facility]'s drug information is an informational resource designed to assist licensed healthcare practitioners in caring for their patients and/or to serve consumers viewing this service as a supplement to, and not a substitute for, the expertise, skill, knowledge and judgment of healthcare practitioners. The absence of a warning for a given drug or drug combination in no way should be construed to indicate that the drug or drug combination is safe, effective or appropriate for any given patient. [facility] does not assume any responsibility for any aspect of healthcare administered with the aid of information [facility] provides. The information contained herein is not intended to cover all possible uses, directions, precautions, warnings, drug interactions, allergic reactions, or adverse effects. If you have questions about the drugs you are taking, check with your doctor, nurse or pharmacist.  Copyright 7317-4035 [address] [place] [address] Dr ALMEIDA. Version: 7.02. Revision date: 3/24/2016. Care instructions adapted under license by [facility] ([facility]).  If you have questions about a medical condition or this instruction, always ask your healthcare professional. [address] any warranty or liability for your use of this information. aspirin (oral)  Pronunciation:  AS pir in  Brand:  Arthritis Pain, Aspi-Cor, Aspir-Low, Myra Plus, Durlaza, Ecotrin, Miniprin, Vazalore  What is the most important information I should know about aspirin? Aspirin can cause Reye's syndrome, a serious and sometimes fatal condition in children. What is aspirin? Aspirin is a salicylate (dj-ZSW-[id number]-ate) that is used to treat pain, and reduce fever or inflammation. Aspirin is sometimes used to treat or prevent heart attacks, strokes, and chest pain (angina). Aspirin should be used for these conditions only under the supervision of a doctor. Aspirin may also be used for purposes not listed in this medication guide. What should I discuss with my healthcare provider before taking aspirin? Using aspirin in a child or teenager with flu symptoms or chickenpox can cause a serious or fatal condition called Reye's syndrome. You should not use aspirin if you are allergic to it, or if you have:  a recent history of stomach or intestinal bleeding;  a bleeding disorder such as hemophilia; or  if you have ever had an asthma attack or severe allergic reaction after taking aspirin or an NSAID (non-steroidal anti-inflammatory drug). Tell your doctor if you have ever had:  asthma or seasonal allergies;  stomach ulcers;  liver disease;  kidney disease;  a bleeding or blood clotting disorder;  gout; or  heart disease, high blood pressure, or congestive heart failure. Taking aspirin during late pregnancy may cause bleeding in the mother or the baby during delivery. Tell your doctor if you are pregnant or plan to become pregnant. You should not breastfeed while using this medicine. How should I take aspirin?   Use exactly as directed on the label, or as prescribed by your doctor. Always follow directions on the medicine label about giving aspirin to a child. Take with food if aspirin upsets your stomach. You must chew the chewable tablet before you swallow it. Do not crush, chew, break, or open an enteric-coated  or delayed/extended-release pill. Swallow it whole. Tell your doctor if you have a planned surgery. Store at room temperature away from moisture and heat. Do not use aspirin if you smell a strong vinegar odor in the aspirin bottle. The medicine may no longer be effective. What happens if I miss a dose? Aspirin is used when needed. If you are on a dosing schedule, skip any missed dose. Do not use two doses at one time. What happens if I overdose? Seek emergency medical attention or call the Poison Help line at 1-195.288.1742. Overdose may cause stomach pain, vomiting, diarrhea, vision or hearing problems, fast or slow breathing, or confusion. What should I avoid while taking aspirin? Avoid alcohol. Heavy drinking can increase your risk of stomach bleeding. Avoid taking ibuprofen if you take aspirin to prevent stroke or heart attack. Ibuprofen can make aspirin less effective in protecting your heart and blood vessels. Ask your doctor how far apart your doses should be. Ask a doctor or pharmacist before using other medicines for pain, fever, swelling, or cold/flu symptoms. They may contain ingredients similar to aspirin (such as magnesium salicylate, ibuprofen, ketoprofen, or naproxen). What are the possible side effects of aspirin? Get emergency medical help if you have signs of an allergic reaction: hives; difficult breathing; swelling of your face, lips, tongue, or throat.   Stop using aspirin and call your doctor at once if you have:  ringing in your ears, confusion, hallucinations, rapid breathing, seizure (convulsions);  severe nausea, vomiting, or stomach pain;  bloody or tarry stools, coughing up blood or vomit that looks like coffee grounds;  fever lasting longer than 3 days; or  swelling, or pain lasting longer than 10 days. Common side effects may include:  upset stomach, heartburn;  drowsiness; or  mild headache. This is not a complete list of side effects and others may occur. Call your doctor for medical advice about side effects. You may report side effects to FDA at 6-819-FIL-1785. What other drugs will affect aspirin? Ask your doctor before using aspirin if you take an antidepressant. Taking certain antidepressants with aspirin may cause you to bruise or bleed easily. Ask a doctor or pharmacist before using aspirin with any other medications, especially:  a blood thinner (warfarin, Coumadin, [place]), or other medication used to prevent blood clots; or  other salicylates such as Nuprin Backache Caplet, Kaopectate, KneeRelief, Pamprin Cramp Formula, Pepto-Bismol, Tricosal, Trilisate, and others. This list is not complete. Other drugs may affect aspirin, including prescription and over-the-counter medicines, vitamins, and herbal products. Not all possible drug interactions are listed here. Where can I get more information? Your pharmacist can provide more information about aspirin. Remember, keep this and all other medicines out of the reach of children, never share your medicines with others, and use this medication only for the indication prescribed. Every effort has been made to ensure that the information provided by [address] is accurate, up-to-date, and complete, but no guarantee is made to that effect. Drug information contained herein may be time sensitive. [facility] information has been compiled for use by healthcare practitioners and consumers in the [facility] and therefore [facility] does not warrant that uses outside of the [facility] are appropriate, unless specifically indicated otherwise. [facility] information does not endorse drugs, diagnose patients or recommend therapy.  [facility]'s drug information is an informational resource designed to assist licensed healthcare practitioners in caring for their patients and/or to serve consumers viewing this service as a supplement to, and not a substitute for, the expertise, skill, knowledge and judgment of healthcare practitioners. The absence of a warning for a given drug or drug combination in no way should be construed to indicate that the drug or drug combination is safe, effective or appropriate for any given patient. [facility] does not assume any responsibility for any aspect of healthcare administered with the aid of information [facility] provides. The information contained herein is not intended to cover all possible uses, directions, precautions, warnings, drug interactions, allergic reactions, or adverse effects. If you have questions about the drugs you are taking, check with your doctor, nurse or pharmacist.  Copyright 0441-3899 [address] [place] [address] Dr ALMEIDA. Version: 16.03. Revision date: 6/28/2021. Care instructions adapted under license by [facility] ([facility]). If you have questions about a medical condition or this instruction, always ask your healthcare professional. [place] [address] any warranty or liability for your use of this information. ticagrelor  Pronunciation:  efrain mora or  Brand:  Brilinta (ticagrelor)  What is the most important information I should know about ticagrelor? You should not use ticagrelor if you have any active bleeding or a history of bleeding in the brain. Do not use this medicine just before heart bypass surgery. Ticagrelor may cause you to bleed more easily, which can be severe or life-threatening. Call your doctor or seek emergency medical attention if you have bleeding that will not stop, black or bloody stools, red or pink urine, or if you cough up blood or vomit that looks like coffee grounds. Tell your doctor about all your current medicines and any you start or stop using.  Many drugs can interact with ticagrelor. Do not stop taking ticagrelor without first talking to your doctor, even if you have signs of bleeding. Stopping ticagrelor may increase your risk of a heart attack or stroke. What is ticagrelor? Ticagrelor is used to lower your risk of heart attack, stroke, or death due to a blocked artery or a prior heart attack. Ticagrelor is also used to lower your risk of blood clots if you have coronary artery disease (decreased blood flow to the heart) and have been treated with stents to open clogged arteries. Ticagrelor is also used to lower your risk of a first heart attack or stroke if you have decreased blood flow to the heart. Ticagrelor is also used to lower the risk of stroke and death in adults with a blockage or decreased blood flow in an artery that supplies blood to the brain. Ticagrelor is usually given together with low-dose aspirin. Carefully follow your doctor's dosing instructions. Using too much aspirin can make ticagrelor less effective. Ticagrelor may also be used for purposes not listed in this medication guide. What should I discuss with my healthcare provider before taking ticagrelor? You should not use ticagrelor if you are allergic to it, or if you have:  any active bleeding; or  a history of bleeding in the brain (such as from a head injury). Tell your doctor if you have ever had:  a stroke;  heart problems;  a surgery or bleeding injury;  bleeding problems;  a stomach ulcer or colon polyps;  liver disease; or  asthma, COPD (chronic obstructive pulmonary disorder) or other breathing problem. It is not known whether this medicine will harm an unborn baby. Tell your doctor if you are pregnant or plan to become pregnant. You should not breastfeed while using ticagrelor. How should I take ticagrelor? Follow all directions on your prescription label and read all medication guides or instruction sheets. Ticagrelor is taken together with aspirin.   Use these medicines exactly as directed. Do not take more aspirin than your doctor has prescribed. Taking too much aspirin can make ticagrelor less effective. Take ticagrelor at the same times each day, with or without food. If you cannot swallow a tablet whole, crush the pill and mix it with water. Stir and drink this mixture right away. Add more water to the glass, stir, and drink right away. Ticagrelor keeps your blood from coagulating (clotting) and can make it easier for you to bleed, even from a minor injury. Contact your doctor or seek emergency medical attention if you have any bleeding that will not stop. To prevent excessive bleeding, you may need to stop using ticagrelor for a short time before a surgery, medical procedure, or dental work. Any healthcare provider who treats you should know that you are taking ticagrelor. Do not stop taking ticagrelor without first talking to your doctor, even if you have signs of bleeding. Stopping the medicine could increase your risk of a heart attack or stroke. This medicine may affect medical testing for platelets in your blood and you may have false results. Tell the laboratory staff that you use ticagrelor. Store at room temperature away from moisture and heat. What happens if I miss a dose? Skip the missed dose and use your next dose at the regular time. Do not use two doses at one time. What happens if I overdose? Seek emergency medical attention or call the Poison Help line at 1-891.393.7449. Overdose can cause excessive bleeding. What should I avoid while taking ticagrelor? Drinking alcohol while taking aspirin can increase your risk of stomach bleeding. Avoid activities that may increase your risk of bleeding or injury. Use extra care to prevent bleeding while shaving or brushing your teeth. While taking ticagrelor with aspirin, avoid using medicines for pain, fever, swelling, or cold/flu symptoms.  They may contain ingredients similar to aspirin (such as salicylates, ibuprofen, ketoprofen, or naproxen). Taking certain products together can cause you to get too much aspirin which can increase your risk of bleeding. What are the possible side effects of ticagrelor? Get emergency medical help if you have signs of an allergic reaction: hives; difficult breathing; swelling of your face, lips, tongue, or throat. Call your doctor at once if you have:  slow heartbeats;  nosebleeds, or any bleeding that will not stop;  shortness of breath even with mild exertion or while lying down;  easy bruising, unusual bleeding, purple or red spots under your skin;  red, pink, or brown urine;  black, bloody, or tarry stools; or  coughing up blood or vomit that looks like coffee grounds. Common side effects may include:  bleeding; or  shortness of breath. This is not a complete list of side effects and others may occur. Call your doctor for medical advice about side effects. You may report side effects to FDA at 9-365-FDA-6653. What other drugs will affect ticagrelor? Sometimes it is not safe to use certain medications at the same time. Some drugs can affect your blood levels of other drugs you take, which may increase side effects or make the medications less effective. Tell your doctor about all your current medicines. Many drugs can affect ticagrelor, especially:  antifungal medicine;  antiviral medicine to treat HIV or AIDS;  a blood thinner;  cholesterol medication;  heart or blood pressure medication;  opioid medication;  seizure medicine; or  tuberculosis medicine. This list is not complete and many other drugs may affect ticagrelor. This includes prescription and over-the-counter medicines, vitamins, and herbal products. Not all possible drug interactions are listed here. Where can I get more information? Your pharmacist can provide more information about ticagrelor.   Remember, keep this and all other medicines out of the reach of children, never share your medicines with others, and use this medication only for the indication prescribed. Every effort has been made to ensure that the information provided by [address] is accurate, up-to-date, and complete, but no guarantee is made to that effect. Drug information contained herein may be time sensitive. [facility] information has been compiled for use by healthcare practitioners and consumers in the [place] and therefore [facility] does not warrant that uses outside of the [place] are appropriate, unless specifically indicated otherwise. [facility]'s drug information does not endorse drugs, diagnose patients or recommend therapy. [facility]'s drug information is an informational resource designed to assist licensed healthcare practitioners in caring for their patients and/or to serve consumers viewing this service as a supplement to, and not a substitute for, the expertise, skill, knowledge and judgment of healthcare practitioners. The absence of a warning for a given drug or drug combination in no way should be construed to indicate that the drug or drug combination is safe, effective or appropriate for any given patient. [facility] does not assume any responsibility for any aspect of healthcare administered with the aid of information [facility] provides. The information contained herein is not intended to cover all possible uses, directions, precautions, warnings, drug interactions, allergic reactions, or adverse effects. If you have questions about the drugs you are taking, check with your doctor, nurse or pharmacist.  Copyright 6264-6445 [address]. Version: 5.01. Revision date: 1/22/2021. Care instructions adapted under license by [facility] ([facility]). If you have questions about a medical condition or this instruction, always ask your healthcare professional. [place] [address] any warranty or liability for your use of this information.        nitroglycerin (oral/sublingual)  Pronunciation:  JUANCHO SMART ser in (OR al/sub YOUSUF gwal)  Brand:  GoNitro, Nitrolingual, Nitromist, Nitrostat, Nitro-Time  What is the most important information I should know about nitroglycerin? Get emergency medical help if you still have chest pain after using a total of 3 doses in 15 minutes, or if you have chest pain that seems unusual.  What is nitroglycerin? Nitroglycerin is used to treat or prevent attacks of chest pain (angina). Nitroglycerin may also be used for purposes not listed in this medication guide. What should I discuss with my healthcare provider before taking nitroglycerin? You may not be able to use nitroglycerin if you have:  severe anemia (low red blood cells);  increased pressure inside the skull;  circulation problems or shock (pale or clammy skin, cold sweat, numbness or tingling, fast or irregular heartbeats, or feeling like you might pass out); or  if you also take riociguat or vericiguat. Do not use erectile dysfunction medicine (such as Viagra, Cialis, or Levitra) or you could have a sudden and serious decrease in blood pressure. Tell your doctor if you have ever had:  an allergic reaction to nitroglycerin;  a heart attack or other heart problems; or  low blood pressure. Tell your doctor if you are pregnant or breastfeeding. Not approved for use by anyone younger than 25years old. How should I take nitroglycerin? Follow all directions on your prescription label and read all medication guides or instruction sheets. Use the medicine exactly as directed. Try to rest or stay seated when you take nitroglycerin (may cause dizziness or fainting). Read and carefully follow any Instructions for Use provided with your medicine. Ask your doctor or pharmacist if you do not understand these instructions. Use this medicine at the first sign of chest pain. Use another dose every 5 minutes as needed, up to a total of 3 doses.   Get emergency medical help if you still have chest pain after using a total of 3 doses in 15 minutes, or if your chest pain seems unusual.  You may use nitroglycerin sublingual within 5 to 10 minutes before an activity that might cause chest pain. This medicine can affect the results of certain medical tests. Tell any doctor who treats you that you are using nitroglycerin. If you take nitroglycerin on a regular schedule to prevent angina, do not stop taking it suddenly or you could have a severe attack of angina. Keep this medicine on hand at all times. Get your prescription refilled before you run out of medicine completely. Store nitroglycerin at room temperature away from moisture and heat. Keep the spray away from open flame or high heat, such as in a car on a hot day. The canister may explode if it gets too hot. What happens if I miss a dose? If you take nitroglycerin on a regular schedule, skip any missed dose if it's almost time for your next dose. Do not use two doses at one time. What happens if I overdose? Seek emergency medical attention or call the Poison Help line at 1-563.238.3785. An overdose of nitroglycerin can be fatal.  Overdose symptoms may include a throbbing headache, confusion, pounding heartbeats, vision problems, vomiting, bloody diarrhea, sweating, clammy skin, blue lips, weak or shallow breathing, loss of movement, seizure, or fainting. What should I avoid while taking nitroglycerin? Avoid getting up too fast from a sitting or lying position, or you may feel dizzy. Drinking alcohol can increase certain side effects such as dizziness, drowsiness, feeling light-headed, or fainting. What are the possible side effects of nitroglycerin? Get emergency medical help if you have signs of an allergic reaction: hives, sweating, pale skin; nausea, vomiting; feeling weak or light-headed; difficult breathing; swelling of your face, lips, tongue, or throat.   Call your doctor at once if you have:  severe or throbbing headaches that do not become less severe with continued use of nitroglycerin;  pounding heartbeats or fluttering in your chest;  slow heart rate;  a light-headed feeling, like you might pass out;  blurred vision or dry mouth; or  heart attack symptoms --chest pain or pressure, pain spreading to your jaw or shoulder, nausea, sweating. Nitroglycerin can cause severe headaches that should become less severe as you continue to use the medicine. Common side effects may include:  flushing (sudden warmth, redness, or tingly feeling);  feeling light-headed, fainting;  headache, dizziness; or  numbness, tingling, burning pain. This is not a complete list of side effects and others may occur. Call your doctor for medical advice about side effects. You may report side effects to FDA at 0-006-FDA-6238. What other drugs will affect nitroglycerin? Tell your doctor about all your current medicines, especially:  aspirin, heparin;  a diuretic (water pill);  any medicine to treat erectile dysfunction;  medicine to treat depression or psychiatric illness;  medicine used to treat blood clots;  heart or blood pressure medicine; or  migraine headache medicine, such as ergot medicine (dihydroergotamine, ergotamine, ergonovine, methylergonovine). This list is not complete and many other drugs may affect nitroglycerin. This includes prescription and over-the-counter medicines, vitamins, and herbal products. Not all possible drug interactions are listed here. Where can I get more information? Your pharmacist can provide more information about nitroglycerin. Remember, keep this and all other medicines out of the reach of children, never share your medicines with others, and use this medication only for the indication prescribed. Every effort has been made to ensure that the information provided by [address]ania [address] is accurate, up-to-date, and complete, but no guarantee is made to that effect. Drug information contained herein may be time sensitive.  [facility] information has been compiled for use by healthcare practitioners and consumers in the [place] and therefore [facility] does not warrant that uses outside of the [place] are appropriate, unless specifically indicated otherwise. [facility]'s drug information does not endorse drugs, diagnose patients or recommend therapy. [facility] drug information is an informational resource designed to assist licensed healthcare practitioners in caring for their patients and/or to serve consumers viewing this service as a supplement to, and not a substitute for, the expertise, skill, knowledge and judgment of healthcare practitioners. The absence of a warning for a given drug or drug combination in no way should be construed to indicate that the drug or drug combination is safe, effective or appropriate for any given patient. [facility] does not assume any responsibility for any aspect of healthcare administered with the aid of information [facility] provides. The information contained herein is not intended to cover all possible uses, directions, precautions, warnings, drug interactions, allergic reactions, or adverse effects. If you have questions about the drugs you are taking, check with your doctor, nurse or pharmacist.  Copyright 8972-3550 [address]. Version: 16.01. Revision date: 6/4/2021. Care instructions adapted under license by [facility] ([facility]). If you have questions about a medical condition or this instruction, always ask your healthcare professional. [place] [address] any warranty or liability for your use of this information. metoprolol (oral/injection)  Pronunciation:  me TOE pro lol  Brand:  Kapspargo Sprinkle, Lopressor, Metoprolol Succinate ER, Metoprolol Tartrate, Toprol-XL  What is the most important information I should know about metoprolol?   You should not use this medicine if you have a serious heart problem (heart block, sick sinus syndrome, slow heart rate), severe circulation problems, severe heart failure, or a history of slow heart beats that caused fainting. What is metoprolol? Metoprolol is a beta-blocker that affects the heart and circulation (blood flow through arteries and veins). Metoprolol is used to treat angina (chest pain) and hypertension (high blood pressure). It is also used to lower your risk of death or needing to be hospitalized for heart failure. Metoprolol injection is used during the early phase of a heart attack to lower the risk of death. Metoprolol may also be used for other purposes not listed in this medication guide. What should I discuss with my healthcare provider before taking metoprolol? You should not use this medicine if you are allergic to metoprolol, or other beta-blockers (atenolol, carvedilol, labetalol, nadolol, nebivolol, propranolol, sotalol, and others), or if you have:  a serious heart problem such as heart block, sick sinus syndrome, or slow heart rate;  severe circulation problems;  severe heart failure (that required you to be in the hospital); or  a history of slow heart beats that have caused you to faint. Tell your doctor if you have ever had:  asthma, chronic obstructive pulmonary disease (COPD), sleep apnea, or other breathing disorder;  diabetes (taking metoprolol may make it harder for you to tell when you have low blood sugar);  liver disease;  congestive heart failure;  problems with circulation (such as Raynaud's syndrome);  a thyroid disorder; or  pheochromocytoma (tumor of the adrenal gland). Do not give this medicine to a child without medical advice. Tell your doctor if you are pregnant or plan to become pregnant. It is not known whether metoprolol will harm an unborn baby. However, having high blood pressure during pregnancy may cause complications such as diabetes or eclampsia (dangerously high blood pressure that can lead to medical problems in both mother and baby).  The benefit of treating hypertension may outweigh any risks to the information provided by [address]ania [address] is accurate, up-to-date, and complete, but no guarantee is made to that effect. Drug information contained herein may be time sensitive. [facility] information has been compiled for use by healthcare practitioners and consumers in the [place] and therefore [facility] does not warrant that uses outside of the [place] are appropriate, unless specifically indicated otherwise. [facility]'s drug information does not endorse drugs, diagnose patients or recommend therapy. [facility]'s drug information is an informational resource designed to assist licensed healthcare practitioners in caring for their patients and/or to serve consumers viewing this service as a supplement to, and not a substitute for, the expertise, skill, knowledge and judgment of healthcare practitioners. The absence of a warning for a given drug or drug combination in no way should be construed to indicate that the drug or drug combination is safe, effective or appropriate for any given patient. [facility] does not assume any responsibility for any aspect of healthcare administered with the aid of information [facility] provides. The information contained herein is not intended to cover all possible uses, directions, precautions, warnings, drug interactions, allergic reactions, or adverse effects. If you have questions about the drugs you are taking, check with your doctor, nurse or pharmacist.  Copyright 4120-1916 [facility] [place]. Version: 17.03. Revision date: 5/29/2019. Care instructions adapted under license by [facility] ([facility]). If you have questions about a medical condition or this instruction, always ask your healthcare professional. [place] [address] any warranty or liability for your use of this information. losartan  Pronunciation:  schuyler JODI tan  Brand:  Cozaar  What is the most important information I should know about losartan?   Do not use if you are pregnant, and tell your doctor right away if you become pregnant. Losartan can cause injury or death to the unborn baby during your second or third trimester. If you have diabetes, do not use losartan together with any medication that contains aliskiren (a blood pressure medicine). What is losartan? Losartan is an angiotensin II receptor antagonist (sometimes called an ARB blocker). Losartan is used to treat high blood pressure (hypertension) in adults and children who are at least 10years old. It is also used to lower the risk of stroke in certain people with heart disease. Losartan is also used to slow long-term kidney damage in people with type 2 diabetes who also have high blood pressure. Losartan may also be used for purposes not listed in this medication guide. What should I discuss with my healthcare provider before taking losartan? You should not use losartan if you are allergic to it. If you have diabetes, do not use losartan together with any medication that contains aliskiren (a blood pressure medicine). You may also need to avoid taking losartan with aliskiren if you have kidney disease. Do not use if you are pregnant, and tell your doctor right away if you become pregnant. Losartan can cause injury or death to the unborn baby if you take the medicine during your second or third trimester. Tell your doctor if you have ever had:  kidney disease;  liver disease;  congestive heart failure;  an electrolyte imbalance (such as high levels of potassium in your blood);  if you are on a low-salt diet; or  if you are dehydrated. You should not breast-feed while using this medicine. Losartan is not approved for use by anyone younger than 10years old. How should I take losartan? Follow all directions on your prescription label and read all medication guides or instruction sheets. Your doctor may occasionally change your dose. Use the medicine exactly as directed. You may take losartan with or without food.   Call your doctor if you are sick with vomiting or diarrhea, or if you are sweating more than usual. You can easily become dehydrated while taking losartan. This can lead to very low blood pressure, a serious electrolyte imbalance, or kidney failure. Your blood pressure will need to be checked often and you may need other blood and urine tests. It may take 3 to 6 weeks before your blood pressure is under control. For best results, keep using the medicine as directed. Talk with your doctor if your condition does not improve after 3 weeks of treatment. If you have high blood pressure, keep using this medicine even if you feel well. High blood pressure often has no symptoms. You may need to use blood pressure medicine for the rest of your life. Store at room temperature away from moisture, heat, and light. What happens if I miss a dose? Take the medicine as soon as you can, but skip the missed dose if it is almost time for your next dose. Do not take two doses at one time. What happens if I overdose? Seek emergency medical attention or call the Poison Help line at 1-157.388.9221. What should I avoid while taking losartan? Drinking alcohol can further lower your blood pressure and may increase certain side effects of losartan. Do not use potassium supplements or salt substitutes, unless your doctor has told you to. Avoid getting up too fast from a sitting or lying position, or you may feel dizzy. What are the possible side effects of losartan? Get emergency medical help if you have signs of an allergic reaction: hives; difficult breathing; swelling of your face, lips, tongue, or throat.   Call your doctor at once if you have:  a light-headed feeling, like you might pass out;  pain or burning when you urinate;  high potassium level --nausea, weakness, tingly feeling, chest pain, irregular heartbeats, loss of movement; or  kidney problems --little or no urination, rapid weight gain, painful or difficult urination, for, the expertise, skill, knowledge and judgment of healthcare practitioners. The absence of a warning for a given drug or drug combination in no way should be construed to indicate that the drug or drug combination is safe, effective or appropriate for any given patient. [facility] does not assume any responsibility for any aspect of healthcare administered with the aid of information [facility] provides. The information contained herein is not intended to cover all possible uses, directions, precautions, warnings, drug interactions, allergic reactions, or adverse effects. If you have questions about the drugs you are taking, check with your doctor, nurse or pharmacist.  Copyright 4346-2435 [address]. Version: 16.01. Revision date: 4/10/2019. Care instructions adapted under license by [facility] [place] ([facility]). If you have questions about a medical condition or this instruction, always ask your healthcare professional. [place] [address] any warranty or liability for your use of this information. rosuvastatin  Pronunciation:  nelida ESTEE va sta tin  Brand:  Crestor, Ezallpaco Jean Baptiste  What is the most important information I should know about rosuvastatin? Do not use rosuvastatin if you are pregnant. It could harm the unborn baby. You should not take rosuvastatin if you have liver disease, or if you breastfeeding a baby. What is rosuvastatin? Rosuvastatin is used together with diet to lower blood levels of \"bad\" cholesterol (low-density lipoprotein, or LDL), to increase levels of \"good\" cholesterol (high-density lipoprotein, or HDL), and to lower triglycerides (a type of fat in the blood). Rosuvastatin is also used to treat hereditary types of high cholesterol (hypercholesterolemia): The heterozygous type (inherited from one parent) or the homozygous type (inherited from both parents). For the heterozygous type, rosuvastatin can be used in children who are at least 6years old.  For the homozygous type, rosuvastatin can be used in children as young as 9years old. The Ezallor brand of rosuvastatin is for use only in adults. Crestor is also used in adults to slow the progression of atherosclerosis (a build-up of plaque in blood vessels that can block blood flow). Crestor is also used to lower the risk of stroke, heart attack, and other complications in men 48 years and older or women 60 years and older who have coronary heart disease or other risk factors. Rosuvastatin may also be used for purposes not listed in this medication guide. What should I discuss with my healthcare provider before taking rosuvastatin? You should not take rosuvastatin if you are allergic to it, or if you have:  liver disease; or  if you are pregnant or breast-feeding. Do not take rosuvastatin if you are pregnant. It could harm the unborn baby. Use effective birth control to prevent pregnancy while you are taking rosuvastatin. Stop taking rosuvastatin and tell your doctor right away if you become pregnant. Do not  breastfeed while you are taking rosuvastatin. Tell your doctor if you have ever had:  liver problems;  kidney disease;  a thyroid disorder;  a habit of drinking more than 2 alcoholic beverages per day;  if you are of  descent; or  if you are 72 or older. Rosuvastatin can cause the breakdown of muscle tissue, which can lead to kidney failure. This happens more often in women, in older adults, or people who have kidney disease or poorly controlled hypothyroidism (underactive thyroid). People of  descent may absorb rosuvastatin at a higher rate than other people. Make sure your doctor knows if you are . You may need a lower than normal starting dose. How should I take rosuvastatin? Follow all directions on your prescription label and read all medication guides or instruction sheets. Your doctor may occasionally change your dose. Use the medicine exactly as directed.   Rosuvastatin is usually taken once a day, with or without food. Take the medicine at the same time each day. While using rosuvastatin, you may need frequent blood tests. Keep using this medicine as directed, even if you feel well. High cholesterol usually has no symptoms. You may need to take rosuvastatin on a long-term basis. You may need to stop using rosuvastatin for a short time if you have:  uncontrolled seizures;  an electrolyte imbalance (such as high or low potassium levels in your blood);  severely low blood pressure;  a severe infection or illness;  dehydration; or  surgery or a medical emergency. You should not stop using rosuvastatin unless your doctor tells you to. Rosuvastatin is only part of a treatment program that may also include diet, exercise, and weight control. Follow your doctor's instructions very closely. Store at room temperature away from moisture, heat, and light. What happens if I miss a dose? Take the medicine as soon as you can, but skip the missed dose if you are more than 12 hours late for the dose. Do not use two doses at one time. What happens if I overdose? Seek emergency medical attention or call the Poison Help line at 1-446.437.9723. What should I avoid while taking rosuvastatin? Avoid eating foods high in fat or cholesterol, or rosuvastatin will not be as effective. Avoid drinking alcohol. It can raise triglyceride levels and may increase your risk of liver damage. Some antacids can make it harder for your body to absorb rosuvastatin. Avoid taking an antacid that contains aluminum or magnesium within 2 hours after taking rosuvastatin. What are the possible side effects of rosuvastatin? Get emergency medical help if you have signs of an allergic reaction: hives; difficult breathing; swelling of your face, lips, tongue, or throat.   Call your doctor at once if you have:  unexplained muscle pain, tenderness, or weakness;  muscle weakness in your hips, shoulders, neck, and back;  trouble lifting your arms, trouble climbing or standing;  confusion, memory problems; or  liver problems --upper stomach pain, tiredness, loss of appetite, dark urine, jaundice (yellowing of the skin or eyes). Common side effects may include:  headache;  weakness;  muscle aches; or  nausea, stomach pain. This is not a complete list of side effects and others may occur. Call your doctor for medical advice about side effects. You may report side effects to FDA at 2-172-FDA-3517. What other drugs will affect rosuvastatin? When you start or stop taking rosuvastatin, your doctor may need to adjust the doses of any other medicines you take on a regular basis. Using certain medicines together with rosuvastatin can increase your risk of serious muscle problems. It is very important to tell your doctor about all medicines you use, and those you start or stop using during your treatment with rosuvastatin, especially:  colchicine;  cyclosporine;  antifungal medicine --fluconazole, itraconazole, ketoconazole;  antiviral medicine to treat HIV or hepatitis C --atazanavir, fosamprenavir, ledipasvir, lopinavir, ritonavir, simeprevir, sofosbuvir, tipranavir, Epclusa, Mavyret, Viekira, Vosevi, and others;  a blood thinner --warfarin, Coumadin, Jantoven;  cancer medicine --darolutamide, regorafenib;  medicines that contain niacin or nicotinic acid --vitamin B3, Advicor, Niaspan, Niacor, Simcor, Slo-Niacin, and others; or  other cholesterol medications --fenofibrate, gemfibrozil. This list is not complete. Other drugs may affect rosuvastatin, including prescription and over-the-counter medicines, vitamins, and herbal products. Not all possible drug interactions are listed here. Where can I get more information? Your pharmacist can provide more information about rosuvastatin.   Remember, keep this and all other medicines out of the reach of children, never share your medicines with others, and use this medication only for the indication prescribed. Every effort has been made to ensure that the information provided by [address] is accurate, up-to-date, and complete, but no guarantee is made to that effect. Drug information contained herein may be time sensitive. [facility] information has been compiled for use by healthcare practitioners and consumers in the [place] and therefore [facility] does not warrant that uses outside of the [place] are appropriate, unless specifically indicated otherwise. [facility]'s drug information does not endorse drugs, diagnose patients or recommend therapy. [facility]'s drug information is an informational resource designed to assist licensed healthcare practitioners in caring for their patients and/or to serve consumers viewing this service as a supplement to, and not a substitute for, the expertise, skill, knowledge and judgment of healthcare practitioners. The absence of a warning for a given drug or drug combination in no way should be construed to indicate that the drug or drug combination is safe, effective or appropriate for any given patient. [facility] does not assume any responsibility for any aspect of healthcare administered with the aid of information [facility] provides. The information contained herein is not intended to cover all possible uses, directions, precautions, warnings, drug interactions, allergic reactions, or adverse effects. If you have questions about the drugs you are taking, check with your doctor, nurse or pharmacist.  Copyright 7384-0138 [address]: 13.01. Revision date: 10/6/2020. Care instructions adapted under license by [facility] ([facility]). If you have questions about a medical condition or this instruction, always ask your healthcare professional. [place] [address] any warranty or liability for your use of this information.

## 2022-12-22 NOTE — ANESTHESIA PRE PROCEDURE
Department of Anesthesiology  Preprocedure Note       Name:  Cynthia Packer   Age:  50 y.o.  :  1970                                          MRN:  [id number]         Date:  2022      Surgeon: En Porter):  Lorraine Aguilar MD    Procedure: Procedure(s):  PERCUTANEOUS CORONARY INTERVENTION    Medications prior to admission:   Prior to Admission medications    Medication Sig Start Date End Date Taking?  Authorizing Provider   phenylephrine-APAP-guaiFENesin (TYLENOL SINUS SEVERE) 5-325-200 MG TABS Take 2 tablets by mouth every 4 hours as needed   Yes Historical Provider, MD   aspirin-acetaminophen-caffeine (EXCEDRIN MIGRAINE) 066-601-75 MG per tablet Take 1 tablet by mouth every 6 hours as needed for Headaches    Historical Provider, MD   amoxicillin (AMOXIL) 500 MG capsule Take 500 mg by mouth 3 times daily    Historical Provider, MD       Current medications:    Current Facility-Administered Medications   Medication Dose Route Frequency Provider Last Rate Last Admin    losartan (COZAAR) tablet 50 mg  50 mg Oral Daily Lorraine Aguilar MD   50 mg at 22 9079    metoprolol succinate (TOPROL XL) extended release tablet 100 mg  100 mg Oral Daily Lorraine Aguilar MD   100 mg at 22 0873    rosuvastatin (CRESTOR) tablet 40 mg  40 mg Oral Nightly Lorraine Aguilar MD   40 mg at 22    colchicine (COLCRYS) tablet 0.6 mg  0.6 mg Oral Daily Lorraine Aguilar MD   0.6 mg at 22 0956    ticagrelor (BRILINTA) tablet 90 mg  90 mg Oral BID Lorraine Aguilar MD   90 mg at 22 0532    0.9 % sodium chloride infusion   IntraVENous Continuous Terressa Rein, APRN - CNP   Stopped at 22 0831    sodium chloride flush 0.9 % injection 5-40 mL  5-40 mL IntraVENous 2 times per day Terressa Rein, APRN - CNP   10 mL at 22 0831    sodium chloride flush 0.9 % injection 5-40 mL  5-40 mL IntraVENous PRN Terressa Rein, APRN - CNP        ondansetron [facility]) injection 4 mg  4 mg IntraVENous Q4H PRN Edna [place], APRN - CNP        acetaminophen (TYLENOL) tablet 650 mg  650 mg Oral Q6H PRN Edna [place], APRN - CNP        Or    acetaminophen (TYLENOL) suppository 650 mg  650 mg Rectal Q6H PRN Edna [place], APRN - CNP        polyethylene glycol (GLYCOLAX) packet 17 g  17 g Oral Daily PRN Edna [place], APRN - CNP        aspirin chewable tablet 81 mg  81 mg Oral Daily Edna [place], APRN - CNP   81 mg at 12/22/22 0827    nitroGLYCERIN (NITROSTAT) SL tablet 0.4 mg  0.4 mg SubLINGual Q5 Min PRN Edna [place], APRN - CNP        potassium chloride (KLOR-CON M) extended release tablet 40 mEq  40 mEq Oral PRN Edna [place], APRN - CNP        Or    potassium bicarb-citric acid (EFFER-K) effervescent tablet 40 mEq  40 mEq Oral PRN Edna [place], APRN - CNP        Or    potassium chloride 10 mEq/100 mL IVPB (Peripheral Line)  10 mEq IntraVENous PRN Edna [place], APRN - CNP        magnesium sulfate 2000 mg in 50 mL IVPB premix  2,000 mg IntraVENous PRN Edna [place], APRN - CNP        nitroglycerin (NITRO-BID) 2 % ointment 1 inch  1 inch Topical Q6H PRN Edna [place]illa, APRN - CNP        heparin (porcine) injection 4,000 Units  4,000 Units IntraVENous PRN Edna [place], APRN - CNP   4,000 Units at 12/22/22 0220    heparin (porcine) injection 2,000 Units  2,000 Units IntraVENous PRN Edna [place], APRN - CNP   2,000 Units at 12/21/22 1562    heparin 25,000 units in dextrose 5% 250 mL (premix) infusion  5-30 Units/kg/hr IntraVENous Continuous Edna [place], APRN - CNP   Stopped at 12/22/22 0831    HYDROcodone-acetaminophen (NORCO) 5-325 MG per tablet 1 tablet  1 tablet Oral Q6H PRN Edna [place], APRN - CNP        morphine injection 2 mg  2 mg IntraVENous Q4H PRN Edna [place]illa, APRN - CNP        amoxicillin-clavulanate (AUGMENTIN) 875-125 MG per tablet 1 tablet  1 tablet Oral 2 times per day Edna [place], APRN - CNP   1 tablet at 12/22/22 0827       Allergies:  No Known Allergies    Problem List:    Patient Active Problem List   Diagnosis Code    Chest pain R07.9    Tobacco abuse Z72.0    Hypertension I10    Elevated glucose R73.09       Past Medical History:        Diagnosis Date    Migraines     Prediabetes        Past Surgical History:  No past surgical history on file. Social History:    Social History     Tobacco Use    Smoking status: Every Day     Types: Cigarettes    Smokeless tobacco: Former   Substance Use Topics    Alcohol use: Not on file                                Ready to quit: Yes  Counseling given: Not Answered      Vital Signs (Current):   Vitals:    12/22/22 0013 12/22/22 0457 12/22/22 0730 12/22/22 0828   BP: 127/81 135/85 135/85 135/85   Pulse: 86 96 97 100   Resp: 20 20 19    Temp: 97.7 °F (36.5 °C) 97.7 °F (36.5 °C) 98.3 °F (36.8 °C)    TempSrc: Oral Oral Oral    SpO2: 94% 96% 98%    Weight:  259 lb 4.8 oz (117.6 kg)     Height:                                                  BP Readings from Last 3 Encounters:   12/22/22 135/85   12/20/22 126/65       NPO Status:                                                                                 BMI:   Wt Readings from Last 3 Encounters:   12/22/22 259 lb 4.8 oz (117.6 kg)   12/20/22 250 lb (113.4 kg)     Body mass index is 35.17 kg/m².     CBC:   Lab Results   Component Value Date/Time    WBC 9.5 12/22/2022 01:00 AM    RBC 5.56 12/22/2022 01:00 AM    HGB 16.1 12/22/2022 01:00 AM    HCT 50.4 12/22/2022 01:00 AM    MCV 90.6 12/22/2022 01:00 AM    RDW 13.2 12/22/2022 01:00 AM     12/22/2022 01:00 AM       CMP:   Lab Results   Component Value Date/Time     12/22/2022 01:00 AM    K 4.5 12/22/2022 01:00 AM     12/22/2022 01:00 AM    CO2 28 12/22/2022 01:00 AM    BUN 18 12/22/2022 01:00 AM    CREATININE 1.10 12/22/2022 01:00 AM    LABGLOM >60 12/22/2022 01:00 AM    GLUCOSE 140 12/22/2022 01:00 AM    PROT 7.1 12/20/2022 03:02 PM    CALCIUM 9.1 12/22/2022 01:00 AM    BILITOT 0.4 12/20/2022 03:02 PM    ALKPHOS 51 12/20/2022 03:02 PM    AST 19 12/20/2022 03:02 PM    ALT 42 12/20/2022 03:02 PM       POC Tests: No results for input(s): POCGLU, POCNA, POCK, POCCL, POCBUN, POCHEMO, POCHCT in the last 72 hours. Coags:   Lab Results   Component Value Date/Time    PROTIME 14.4 12/20/2022 05:31 PM    INR 1.1 12/20/2022 05:31 PM    APTT 40.3 12/20/2022 05:31 PM       HCG (If Applicable): No results found for: PREGTESTUR, PREGSERUM, HCG, HCGQUANT     ABGs: No results found for: PHART, PO2ART, NCK6BQU, FJY6IJQ, BEART, D6IRUFBW     Type & Screen (If Applicable):  No results found for: LABABO, LABRH    Drug/Infectious Status (If Applicable):  No results found for: HIV, HEPCAB    COVID-19 Screening (If Applicable): No results found for: COVID19        Anesthesia Evaluation  Patient summary reviewed and Nursing notes reviewed no history of anesthetic complications:   Airway: Mallampati: III     Neck ROM: full  Mouth opening: > = 3 FB   Dental: normal exam         Pulmonary: breath sounds clear to auscultation  (+) sleep apnea: on noncompliant,                             Cardiovascular:    (+) hypertension:, past MI:, CAD:, hyperlipidemia            Echocardiogram reviewed                  Neuro/Psych:   (+) headaches:,             GI/Hepatic/Renal:             Endo/Other: Negative Endo/Other ROS                    Abdominal:             Vascular: negative vascular ROS. Other Findings:           Anesthesia Plan      TIVA     ASA 4     (Pt with recent NSTEMI needs stent but kept moving during cath yesterday. Plan precedex sedation with airway support to prevent obstruction- if continues to have issues with 'startling' and movement, may require deep sedation with propofol and oral airway to maintain airway patency)  Induction: intravenous. Anesthetic plan and risks discussed with patient.                         Abel Martinez MD 12/22/2022

## 2022-12-22 NOTE — PROGRESS NOTES
Pts sister contacted this writer regarding the pts court hearing today at [address] [facility].. She reported that if the court does not receive something from the hospital indicating that he is hospitalized, the pt will likely be arrested once he is discharged. CM faxed progress note with admission details to [phone], docket #[id number] IAM attempted to give the pt a copy of the note and received fax transmittal but was AYANNA. Will provide document when he returns.

## 2022-12-22 NOTE — PROGRESS NOTES
Radial compression band removed at 1445 after slowly reducing air from 12 cc to zero as per hospital protocol. No bleeding or hematoma noted. 2 x 2 gauze with tegaderm placed over puncture site. The affected extremity is warm and dry to the touch. Frequent vital signs printed and placed on bedside chart. Patient instructed to call if any bleeding noted on gauze. Patient verbalized understanding the nursing instructions.

## 2022-12-23 ENCOUNTER — TELEPHONE (OUTPATIENT)
Dept: CARDIOLOGY CLINIC | Age: 52
End: 2022-12-23

## 2022-12-23 NOTE — TELEPHONE ENCOUNTER
Called Maria E back and informed her that Dr. Asher Nuno is aware of the interactions and wants the Colchicine and the Crestor filled. Understanding voiced. She also stated that the [facility] needs a PA. Informed her that we will complete the PA.      PA has been submitted through CoverMyMeds

## 2022-12-27 ENCOUNTER — OFFICE VISIT (OUTPATIENT)
Dept: CARDIOLOGY CLINIC | Age: 52
End: 2022-12-27
Payer: MEDICAID

## 2022-12-27 VITALS
BODY MASS INDEX: 36.1 KG/M2 | DIASTOLIC BLOOD PRESSURE: 88 MMHG | HEART RATE: 88 BPM | SYSTOLIC BLOOD PRESSURE: 136 MMHG | HEIGHT: 72 IN | WEIGHT: 266.5 LBS

## 2022-12-27 DIAGNOSIS — I10 PRIMARY HYPERTENSION: Primary | Chronic | ICD-10-CM

## 2022-12-27 DIAGNOSIS — I25.10 CORONARY ARTERY DISEASE INVOLVING NATIVE CORONARY ARTERY OF NATIVE HEART WITHOUT ANGINA PECTORIS: ICD-10-CM

## 2022-12-27 DIAGNOSIS — Z72.0 TOBACCO ABUSE: Chronic | ICD-10-CM

## 2022-12-27 DIAGNOSIS — E78.2 MIXED HYPERLIPIDEMIA: ICD-10-CM

## 2022-12-27 PROCEDURE — 99213 OFFICE O/P EST LOW 20 MIN: CPT | Performed by: INTERNAL MEDICINE

## 2022-12-27 RX ORDER — AMOXICILLIN AND CLAVULANATE POTASSIUM 875; 125 MG/1; MG/1
1 TABLET, FILM COATED ORAL EVERY 12 HOURS
COMMUNITY
Start: 2022-12-18

## 2022-12-27 ASSESSMENT — ENCOUNTER SYMPTOMS
EYE REDNESS: 0
STRIDOR: 0
DOUBLE VISION: 0
HOARSE VOICE: 0
HEMATOCHEZIA: 0
HEMATEMESIS: 0
HEMOPTYSIS: 0
ABDOMINAL PAIN: 0
WHEEZING: 0

## 2022-12-27 NOTE — PROGRESS NOTES
[facility] CARDIOLOGY  [address] [facility], [address]  PHONE: [phone]          22    NAME:  Carlos Simon  : 1970  MRN: [id number]         SUBJECTIVE:   Carlos Simon is a 50 y.o. male seen for a visit regarding the following:     Chief Complaint   Patient presents with    Follow-Up from Hospital     NSTEMI    Hyperlipidemia    Hypertension           HPI:    Cardio problem list:  1. Coronary artery disease-ACS-non-STEMI-coronary angiography from 2022 showed mild mid LAD and RCA disease, severe obtuse marginal 1 branch vessel disease, OM was stented with a 3.0 x 22 mm FANNIE extending into the second subbranch with T-junction stenting with a 3.0 x 8 mm FANNIE into the first sidebranch. -Echo from 2022 showed an EF of 60 to 65%, mildly dilated left atrium, mild mitral regurgitation, mildly dilated aortic root measuring 4.1 cm in diameter. 2.  Hypertension  3. Hyperlipidemia  4. Smoker  5. Dilated ascending aorta-measured 4.1 cm-father had an aortic dissection    I saw Mr. Tamika Gustafson who is a pleasant 46-year-old gentleman in cardiovascular hospital follow-up for coronary artery disease s/p STEMI requiring drug-eluting stents placed to the obtuse marginal branch of the left circumflex coronary artery, known hypertension and hyperlipidemia. Coronary artery disease: No complaints of any significant angina at this point. Doing well with no further chest pain. Denies any worsening dyspnea exertion orthopnea PND. Hypertension: Denies headaches or blurry vision and remains compliant on his current therapy. Dilated aorta-mildly dilated on echo. No complaints of chest pain in any way. His father had an aortic dissection. Hyperlipidemia-remains on high-dose statin therapy with no myalgias in any way. Past Medical History, Past Surgical History, Family history, Social History, and Medications were all reviewed with the patient today and updated as necessary. No Known Allergies  Patient Active Problem List   Diagnosis    Chest pain    Tobacco abuse    Hypertension    Elevated glucose    NSTEMI (non-ST elevated myocardial infarction) ([facility] [address]    Coronary artery disease involving native coronary artery of native heart    Mixed hyperlipidemia     Past Medical History:   Diagnosis Date    Migraines     Prediabetes      Past Surgical History:   Procedure Laterality Date    CARDIAC PROCEDURE N/A 12/21/2022    LEFT HEART CATH / CORONARY ANGIOGRAPHY performed by Brando Baum MD at [address] [place] [address] CATH LAB    CARDIAC PROCEDURE N/A 12/22/2022    PERCUTANEOUS CORONARY INTERVENTION performed by Brando Baum MD at [address] [place] [address] CATH LAB     History reviewed. No pertinent family history. Social History     Tobacco Use    Smoking status: Every Day     Types: Cigarettes    Smokeless tobacco: Former   Substance Use Topics    Alcohol use: Not on file     Current Outpatient Medications   Medication Sig Dispense Refill    amoxicillin-clavulanate (AUGMENTIN) 875-125 MG per tablet Take 1 tablet by mouth in the morning and 1 tablet in the evening. aspirin 81 MG chewable tablet Take 1 tablet by mouth daily 30 tablet 3    colchicine (COLCRYS) 0.6 MG tablet Take 1 tablet by mouth daily 30 tablet 0    losartan (COZAAR) 50 MG tablet Take 1 tablet by mouth daily 30 tablet 3    metoprolol succinate (TOPROL XL) 100 MG extended release tablet Take 1 tablet by mouth daily 30 tablet 3    nitroGLYCERIN (NITROSTAT) 0.4 MG SL tablet Place 1 tablet under the tongue every 5 minutes as needed for Chest pain up to max of 3 total doses.  If no relief after 1 dose, call 911. 25 tablet 3    rosuvastatin (CRESTOR) 40 MG tablet Take 1 tablet by mouth nightly 30 tablet 3    ticagrelor (BRILINTA) 90 MG TABS tablet Take 1 tablet by mouth 2 times daily 60 tablet 11    aspirin-acetaminophen-caffeine (EXCEDRIN MIGRAINE) 250-250-65 MG per tablet Take 1 tablet by mouth every 6 hours as needed for Headaches amoxicillin (AMOXIL) 500 MG capsule Take 500 mg by mouth 3 times daily      phenylephrine-APAP-guaiFENesin (TYLENOL SINUS SEVERE) 5-325-200 MG TABS Take 2 tablets by mouth every 4 hours as needed       No current facility-administered medications for this visit. Review of Systems   Constitutional: Negative for chills and fever. HENT:  Negative for ear discharge, hoarse voice and stridor. Eyes:  Negative for double vision and redness. Cardiovascular:  Negative for cyanosis and syncope. Respiratory:  Negative for hemoptysis and wheezing. Endocrine: Negative for polydipsia and polyphagia. Hematologic/Lymphatic: Negative for adenopathy. Skin:  Negative for itching and rash. Musculoskeletal:  Negative for joint swelling and muscle weakness. Gastrointestinal:  Negative for abdominal pain, hematemesis and hematochezia. Genitourinary:  Negative for flank pain and nocturia. Neurological:  Negative for focal weakness and seizures. Psychiatric/Behavioral:  Negative for altered mental status and suicidal ideas. Allergic/Immunologic: Negative for hives. PHYSICAL EXAM:    /88   Pulse 88   Ht 6' (1.829 m)   Wt 266 lb 8 oz (120.9 kg)   BMI 36.14 kg/m²      Physical Exam    General: Alert and oriented in no acute distress  HEENT: Head is normocephalic, atraumatic, pupils are equal bilaterally, throat appears to be clear  Neck: No significant jugular venous distention no cervical bruits  Cardiovascular: S1 and S2 heard, regular rate and rhythm, no significant murmurs rubs or gallops. Respiratory: Clear to auscultation bilaterally with no adventitious sounds, respirations are normal  Abdomen: Soft, nontender, nondistended, bowel sounds present. Extremities: No cyanosis clubbing or edema  Peripheral pulses: Bilateral radial artery pulses are palpated. Bilateral pedal pulses are well felt.   Neuro: No facial droop and no gross focal motor deficits  Lymphatic: No significant cervical lymphadenopathy noted. Musculoskeletal: No significant redness or swelling noted in all exposed joints. Skin: No significant rashes noted the of the exposed regions. Medical problems and test results were reviewed with the patient today.      Recent Results (from the past 672 hour(s))   EKG 12 Lead    Collection Time: 12/20/22 12:35 PM   Result Value Ref Range    Ventricular Rate 118 BPM    Atrial Rate 118 BPM    P-R Interval 144 ms    QRS Duration 74 ms    Q-T Interval 310 ms    QTc Calculation (Bazett) 434 ms    P Axis 56 degrees    R Axis -64 degrees    T Axis 54 degrees    Diagnosis Sinus tachycardia    EKG 12 Lead    Collection Time: 12/20/22  2:45 PM   Result Value Ref Range    Ventricular Rate 99 BPM    Atrial Rate 99 BPM    P-R Interval 150 ms    QRS Duration 72 ms    Q-T Interval 316 ms    QTc Calculation (Bazett) 405 ms    P Axis 58 degrees    R Axis -51 degrees    T Axis 60 degrees    Diagnosis !! AGE AND GENDER SPECIFIC ECG ANALYSIS !!    CBC    Collection Time: 12/20/22  3:02 PM   Result Value Ref Range    WBC 8.1 4.3 - 11.1 K/uL    RBC 5.57 4.23 - 5.6 M/uL    Hemoglobin 16.3 13.6 - 17.2 g/dL    Hematocrit 48.6 41.1 - 50.3 %    MCV 87.3 82.0 - 102.0 FL    MCH 29.3 26.1 - 32.9 PG    MCHC 33.5 31.4 - 35.0 g/dL    RDW 13.2 11.9 - 14.6 %    Platelets 644 467 - 818 K/uL    MPV 9.5 9.4 - 12.3 FL    nRBC 0.00 0.0 - 0.2 K/uL   Comprehensive Metabolic Panel    Collection Time: 12/20/22  3:02 PM   Result Value Ref Range    Sodium 140 133 - 143 mmol/L    Potassium 4.3 3.5 - 5.1 mmol/L    Chloride 105 101 - 110 mmol/L    CO2 28 21 - 32 mmol/L    Anion Gap 7 2 - 11 mmol/L    Glucose 252 (H) 65 - 100 mg/dL    BUN 19 6 - 23 MG/DL    Creatinine 1.14 0.8 - 1.5 MG/DL    Est, Glom Filt Rate >60 >60 ml/min/1.73m2    Calcium 9.9 8.3 - 10.4 MG/DL    Total Bilirubin 0.4 0.2 - 1.1 MG/DL    ALT 42 12 - 65 U/L    AST 19 15 - 37 U/L    Alk Phosphatase 51 50 - 136 U/L    Total Protein 7.1 6.3 - 8.2 g/dL    Albumin 3.9 3.5 - 5.0 g/dL    Globulin 3.2 2.8 - 4.5 g/dL    Albumin/Globulin Ratio 1.2 0.4 - 1.6     Troponin    Collection Time: 12/20/22  3:02 PM   Result Value Ref Range    Troponin, High Sensitivity 252.6 (HH) 0 - 14 pg/mL   Lipase    Collection Time: 12/20/22  3:02 PM   Result Value Ref Range    Lipase 97 73 - 393 U/L   Magnesium    Collection Time: 12/20/22  3:02 PM   Result Value Ref Range    Magnesium 2.3 1.8 - 2.4 mg/dL   Troponin    Collection Time: 12/20/22  5:31 PM   Result Value Ref Range    Troponin, High Sensitivity 233.7 (HH) 0 - 14 pg/mL   APTT    Collection Time: 12/20/22  5:31 PM   Result Value Ref Range    PTT 40.3 (H) 24.5 - 34.2 SEC   Protime-INR    Collection Time: 12/20/22  5:31 PM   Result Value Ref Range    Protime 14.4 (H) 12.6 - 14.3 sec    INR 1.1     Anti-Xa, Unfractionated Heparin    Collection Time: 12/20/22 11:33 PM   Result Value Ref Range    Anti-XA Unfrac Heparin <0.10 (L) 0.3 - 0.7 IU/mL   CBC    Collection Time: 12/21/22  7:45 AM   Result Value Ref Range    WBC 9.0 4.3 - 11.1 K/uL    RBC 5.49 4.23 - 5.6 M/uL    Hemoglobin 15.9 13.6 - 17.2 g/dL    Hematocrit 48.5 41.1 - 50.3 %    MCV 88.3 82 - 102 FL    MCH 29.0 26.1 - 32.9 PG    MCHC 32.8 31.4 - 35.0 g/dL    RDW 13.2 11.9 - 14.6 %    Platelets 520 157 - 383 K/uL    MPV 9.4 9.4 - 12.3 FL    nRBC 0.00 0.0 - 0.2 K/uL   Hemoglobin A1C    Collection Time: 12/21/22  7:45 AM   Result Value Ref Range    Hemoglobin A1C 9.1 (H) 4.8 - 5.6 %    eAG 214 mg/dL   Anti-Xa, Unfractionated Heparin    Collection Time: 12/21/22  7:45 AM   Result Value Ref Range    Anti-XA Unfrac Heparin 0.16 (L) 0.3 - 0.7 IU/mL   TSH with Reflex    Collection Time: 12/21/22  7:45 AM   Result Value Ref Range    TSH w Free Thyroid if Abnormal 1.34 0.358 - 3.740 UIU/ML   Vitamin D 25 Hydroxy    Collection Time: 12/21/22  7:45 AM   Result Value Ref Range    Vit D, 25-Hydroxy 35.9 30.0 - 100.0 ng/mL   Basic Metabolic Panel    Collection Time: 12/21/22  7:45 AM   Result Value Ref Range Sodium 139 133 - 143 mmol/L    Potassium 4.7 3.5 - 5.1 mmol/L    Chloride 107 101 - 110 mmol/L    CO2 27 21 - 32 mmol/L    Anion Gap 5 2 - 11 mmol/L    Glucose 200 (H) 65 - 100 mg/dL    BUN 18 6 - 23 MG/DL    Creatinine 1.00 0.8 - 1.5 MG/DL    Est, Glom Filt Rate >60 >60 ml/min/1.73m2    Calcium 8.9 8.3 - 10.4 MG/DL   Lipid Panel    Collection Time: 12/21/22  7:45 AM   Result Value Ref Range    Cholesterol, Total 183 <200 MG/DL    Triglycerides 148 35 - 150 MG/DL    HDL 24 (L) 40 - 60 MG/DL    LDL Calculated 129.4 (H) <100 MG/DL    VLDL Cholesterol Calculated 29.6 (H) 6.0 - 23.0 MG/DL    Chol/HDL Ratio 7.6     Transthoracic echocardiogram (TTE) complete with contrast, bubble, strain, and 3D PRN    Collection Time: 12/21/22  9:45 AM   Result Value Ref Range    LV EDV A2C 126 mL    LV EDV A4C 114 mL    LV ESV A2C 47 mL    LV ESV A4C 51 mL    IVSd 1.2 (A) 0.6 - 1.0 cm    LVIDd 4.6 4.2 - 5.9 cm    LVIDs 3.3 cm    LVOT Diameter 2.3 cm    LVOT Mean Gradient 2 mmHg    LVOT VTI 19.2 cm    LVOT Peak Velocity 1.0 m/s    LVOT Peak Gradient 4 mmHg    LVPWd 1.1 (A) 0.6 - 1.0 cm    LV E' Lateral Velocity 7 cm/s    LV E' Septal Velocity 7 cm/s    LV Ejection Fraction A2C 63 %    LV Ejection Fraction A4C 55 %    EF BP 59 55 - 100 %    LVOT Area 4.2 cm2    LVOT SV 79.7 ml    LA Minor Axis 6.6 cm    LA Major Axis 6.4 cm    LA Area 2C 21.2 cm2    LA Area 4C 21.5 cm2    LA Volume 2C 55 18 - 58 mL    LA Volume 4C 56 18 - 58 mL    LA Volume BP 56 18 - 58 mL    LA Diameter 4.0 cm    AV Mean Velocity 0.9 m/s    AV Mean Gradient 4 mmHg    AV VTI 24.4 cm    AV Peak Velocity 1.3 m/s    AV Peak Gradient 7 mmHg    AV Area by VTI 3.3 cm2    AV Area by Peak Velocity 3.2 cm2    Aortic Root 4.1 cm    Ascending Aorta 3.4 cm    IVC Proxmal 1.9 cm    MV E Wave Deceleration Time 189.0 ms    MV A Velocity 0.80 m/s    MV E Velocity 0.85 m/s    RV Basal Dimension 2.7 cm    RV Free Wall Peak S' 12 cm/s    TAPSE 1.9 1.7 cm    Body Surface Area 2.48 m2 Fractional Shortening 2D 28 28 - 44 %    LV ESV Index A4C 21 mL/m2    LV EDV Index A4C 47 mL/m2    LV ESV Index A2C 20 mL/m2    LV EDV Index A2C 52 mL/m2    LVIDd Index 1.91 cm/m2    LVIDs Index 1.37 cm/m2    LV RWT Ratio 0.48     LV Mass 2D 192.9 88 - 224 g    LV Mass 2D Index 80.1 49 - 115 g/m2    MV E/A 1.06     E/E' Ratio (Averaged) 12.14     E/E' Lateral 12.14     E/E' Septal 12.14     LA Volume Index BP 23 16 - 34 ml/m2    LVOT Stroke Volume Index 33.1 mL/m2    LA Volume Index 2C 23 16 - 34 mL/m2    LA Volume Index 4C 23 16 - 34 mL/m2    LA Size Index 1.66 cm/m2    LA/AO Root Ratio 0.98     Ao Root Index 1.70 cm/m2    Ascending Aorta Index 1.41 cm/m2    AV Velocity Ratio 0.77     LVOT:AV VTI Index 0.79     THERON/BSA VTI 1.4 cm2/m2    THERON/BSA Peak Velocity 1.3 cm2/m2   Cardiac procedure    Collection Time: 12/21/22 12:41 PM   Result Value Ref Range    Body Surface Area 2.48 m2   Anti-Xa, Unfractionated Heparin    Collection Time: 12/21/22 11:17 PM   Result Value Ref Range    Anti-XA Unfrac Heparin <0.10 (L) 0.3 - 0.7 IU/mL   CBC with Auto Differential    Collection Time: 12/22/22  1:00 AM   Result Value Ref Range    WBC 9.5 4.3 - 11.1 K/uL    RBC 5.56 4.23 - 5.6 M/uL    Hemoglobin 16.1 13.6 - 17.2 g/dL    Hematocrit 50.4 (H) 41.1 - 50.3 %    MCV 90.6 82 - 102 FL    MCH 29.0 26.1 - 32.9 PG    MCHC 31.9 31.4 - 35.0 g/dL    RDW 13.2 11.9 - 14.6 %    Platelets 992 236 - 167 K/uL    MPV 9.5 9.4 - 12.3 FL    nRBC 0.00 0.0 - 0.2 K/uL    Differential Type AUTOMATED      Seg Neutrophils 67 43 - 78 %    Lymphocytes 16 13 - 44 %    Monocytes 12 4.0 - 12.0 %    Eosinophils % 4 0.5 - 7.8 %    Basophils 0 0.0 - 2.0 %    Immature Granulocytes 1 0.0 - 5.0 %    Segs Absolute 6.4 1.7 - 8.2 K/UL    Absolute Lymph # 1.5 0.5 - 4.6 K/UL    Absolute Mono # 1.1 0.1 - 1.3 K/UL    Absolute Eos # 0.4 0.0 - 0.8 K/UL    Basophils Absolute 0.0 0.0 - 0.2 K/UL    Absolute Immature Granulocyte 0.1 0.0 - 0.5 K/UL    RBC Comment SLIGHT  ANISOCYTOSIS + POIKILOCYTOSIS        Platelet Comment ADEQUATE     Basic Metabolic Panel    Collection Time: 12/22/22  1:00 AM   Result Value Ref Range    Sodium 138 133 - 143 mmol/L    Potassium 4.5 3.5 - 5.1 mmol/L    Chloride 107 101 - 110 mmol/L    CO2 28 21 - 32 mmol/L    Anion Gap 3 2 - 11 mmol/L    Glucose 140 (H) 65 - 100 mg/dL    BUN 18 6 - 23 MG/DL    Creatinine 1.10 0.8 - 1.5 MG/DL    Est, Glom Filt Rate >60 >60 ml/min/1.73m2    Calcium 9.1 8.3 - 10.4 MG/DL   Magnesium    Collection Time: 12/22/22  1:00 AM   Result Value Ref Range    Magnesium 2.3 1.8 - 2.4 mg/dL   Anti-Xa, Unfractionated Heparin    Collection Time: 12/22/22  8:24 AM   Result Value Ref Range    Anti-XA Unfrac Heparin 0.36 0.3 - 0.7 IU/mL   Cardiac procedure    Collection Time: 12/22/22 10:31 AM   Result Value Ref Range    Body Surface Area 2.48 m2     Lab Results   Component Value Date/Time    CHOL 183 12/21/2022 07:45 AM    HDL 24 12/21/2022 07:45 AM   ,hemoglobin, basic metabolic panel, No results found for: TSH, TSH2, TSH3 ,  Lab Results   Component Value Date/Time     12/22/2022 01:00 AM    K 4.5 12/22/2022 01:00 AM     12/22/2022 01:00 AM    CO2 28 12/22/2022 01:00 AM    BUN 18 12/22/2022 01:00 AM    GLOB 3.2 12/20/2022 03:02 PM    ALT 42 12/20/2022 03:02 PM    AST 19 12/20/2022 03:02 PM      Lab Results   Component Value Date    LDLCALC 129.4 (H) 12/21/2022      Lab Results   Component Value Date    CREATININE 1.10 12/22/2022 12/20/22    TRANSTHORACIC ECHOCARDIOGRAM (TTE) COMPLETE (CONTRAST/BUBBLE/3D PRN) 12/21/2022 11:07 AM (Final)    Interpretation Summary    Left Ventricle: Normal left ventricular systolic function with a visually estimated EF of 60 - 65%. Left ventricle size is normal. Mildly increased wall thickness. Normal wall motion. Abnormal diastolic function. Mitral Valve: Mild regurgitation. Left Atrium: Left atrium is mildly dilated.     Aorta: Normal sized annulus, sinus of Valsalva, ascending aorta, aortic arch, descending aorta and abdominal aorta. Moderately dilated aortic root. Ao root diameter is 4.1 cm. Technical qualifiers: Color flow Doppler was performed and pulse wave and/or continuous wave Doppler was performed. Contrast used: Definity. Signed by: Clarice Cintron MD on 12/21/2022 11:07 AM     Lab Results   Component Value Date    HGB 16.1 12/22/2022      Lab Results   Component Value Date     12/22/2022        ASSESSMENT and PLAN          Coronary artery disease involving native coronary artery of native heart without angina pectoris  -S/p PCI to the obtuse marginal branch of the left circumflex coronary artery-continue dual antiplatelet therapy, beta-blocker therapy, high-dose statin therapy. Primary hypertension  Continue losartan 50 mg daily and metoprolol succinate 100 mg once daily. Reasonably well-controlled pressures on current therapy. Monitor at home. Mixed hyperlipidemia  -Continue rosuvastatin 40 mg daily. We will likely recheck his lipids when we see him next in 3 months. Tobacco abuse   -Counseled on importance of quitting completely. He has cut back substantially. Overall Impression  -I made no changes with his medical therapy. He is on dual antiplatelet therapy, high-dose beta-blocker therapy, ARB therapy, high-dose statin therapy. Counseled on the importance of quitting smoking completely. I went over the results of his echo with him and personally reviewed all the pictures along with his heart catheterization details. His right radial artery is still intact with normal function. Pressures are borderline elevated so have asked him to monitor pressures at home and call us if they are persistently above 140/90. He said he does not need any additional smoking cessation aids at this point. Return in about 3 months (around 3/27/2023) for htn, cad, hld. Thank you for allowing us to participate in the care of your patient.   If you have any further questions, please do not hesitate to contact us.   Sincerely,        Kevin Odom MD   12/27/2022

## 2023-05-02 RX ORDER — ROSUVASTATIN CALCIUM 40 MG/1
40 TABLET, COATED ORAL NIGHTLY
Qty: 30 TABLET | Refills: 1 | Status: SHIPPED | OUTPATIENT
Start: 2023-05-02

## 2023-05-02 NOTE — TELEPHONE ENCOUNTER
Requested Prescriptions     Pending Prescriptions Disp Refills    rosuvastatin (CRESTOR) 40 MG tablet 30 tablet 1     Sig: Take 1 tablet by mouth nightly

## 2024-03-11 RX ORDER — ROSUVASTATIN CALCIUM 40 MG/1
40 TABLET, COATED ORAL NIGHTLY
Qty: 30 TABLET | Refills: 1 | Status: SHIPPED | OUTPATIENT
Start: 2024-03-11

## (undated) DEVICE — CATHETER DIAG AD 5FR L100CM COR NYL JUDKINS R 5 DILATED

## (undated) DEVICE — COPILOT BLEEDBACK CONTROL VALVE: Brand: COPILOT

## (undated) DEVICE — CATHETER DIAG 5FR L100CM SPEC 3 DRC CRV SZ DBL BRAID WIRE

## (undated) DEVICE — CATH BLLN ANGIO 2X15MM SC EUPHORA RX

## (undated) DEVICE — GUIDEWIRE WITH ICE™ HYDROPHILIC COATING: Brand: CHOICE™ PT

## (undated) DEVICE — DEVICE COMPR LNG 27 CM VASC BND

## (undated) DEVICE — Device: Brand: PROWATER

## (undated) DEVICE — GUIDE CATHETER: Brand: RUNWAY™

## (undated) DEVICE — RADIFOCUS OPTITORQUE ANGIOGRAPHIC CATHETER: Brand: OPTITORQUE

## (undated) DEVICE — GUIDEWIRE VASC 7CM JTP .035IN 260CM 3MM

## (undated) DEVICE — GLIDESHEATH SLENDER STAINLESS STEEL KIT: Brand: GLIDESHEATH SLENDER

## (undated) DEVICE — CATHETER DIAG AD 5FR L110CM 145DEG COR GRY HYDRPHLC NYL